# Patient Record
Sex: FEMALE | Race: WHITE | Employment: OTHER | URBAN - METROPOLITAN AREA
[De-identification: names, ages, dates, MRNs, and addresses within clinical notes are randomized per-mention and may not be internally consistent; named-entity substitution may affect disease eponyms.]

---

## 2018-05-02 PROBLEM — K92.1 HEMATOCHEZIA: Status: ACTIVE | Noted: 2018-05-02

## 2018-05-02 PROBLEM — Z86.010 HISTORY OF COLON POLYPS: Status: ACTIVE | Noted: 2018-05-02

## 2019-03-04 PROCEDURE — 88305 TISSUE EXAM BY PATHOLOGIST: CPT | Performed by: INTERNAL MEDICINE

## 2019-11-04 NOTE — H&P (VIEW-ONLY)
[facility] [place] 1940 ICD-10-CM ICD-9-CM 1. Galt-Walker grade 3 cystocele N81.10 618.01 AMB POC URINALYSIS DIP STICK AUTO W/O MICRO 2. Urge urinary incontinence N39.41 788.31 CULTURE, URINE Assessment and Plan: 
UA today: trace blood, trace leuks Cath PVR 10/7/19: 200cc 1. Grade 3 Cystocele / Grade 3 Rectocele Found on exam 10/7/19. Will proceed with Robotic Lap Sacrocolpopexy possible open on 11/26/19 as scheduled. All questions answered to pt and daughter satisfaction. Urine sent for culture pre operatively. Will prescribe abx as necessary. 2. Urgency / UUI Will address post-operatively RTO post-op BMI 25.39 Discussion: Cystocele Repair The risks, benefits and alternatives to surgical intervention were discussed at length with the patient. Alternatives include no intervention, use of pessary, more traditional repair of cystocele, enterocele or rectocele with autologous fascia as support, the use of mesh for support,colpocleisis or the minimally invasive robotic assisted laparoscopic sacrocolpopexy. The patient is most interested in robotic surgery, so this was reviewed in the greatest detail. The intended benefit of mesh placement is to improve or relieve the pelvic organ prolapse symptoms. Risks of the procedure include, but are not limited to, bleeding, wound infection, injury to surrounding structures during the procedure including bowel, mesh erosion to the bladder or extrusion to the vaginal wall, or recurrence of the prolapse due to mesh loosening. If an infection develops around the mesh material, this can be difficult to treat. If the bladder, rectum or urethra is injured during the procedure, this could result in the need for longer post-operative use of a catheter or termination of the procedure without placement of the mesh. The FDA warnings surrounding vaginal mesh were discussed.  The patient expresses an understanding of these risks, benefits and alternatives, had all questions answered and requests that we proceed as planned with robotic sacrocolpopexy in the MOR with intra-operative cystoscopy. Chief Complaint Patient presents with  
 Other  
  cystocele History of Present Illness: 
Yaneli Pichardo is a 78 y.o. female who presents today in follow up for an established history of grade 3 cystocele and grade 3 rectocele. Patient is scheduled for Robotic Lap Sacrocolpopexy possible open on 11/26/19 and presents today for pre-op evaluation. Today, patient is doing well. C/o sensation of bulge in vagina No pelvic pain or dyspareunia NO vaginal discharge Prior hysterectomy for fibroids in the distant past  
  
At baseline she has urinary incontinence Wears pads throughout the day Notes UUI, denies any CHELSEA Neurological issues: NO  
Pelvic or back issues: NO  
Prior kidney or bladder surgeries: NO  
Prior history of nephrolithiasis: NO 
 
 
 
AUA-IPSS: 
AUA Symptom Score 10/7/2019 Over the past month how often have you had the sensation that your bladder was not completely empty after you finished urinating? 3 Over the past month, how often have had to urinate again less than 2 hours after you last finished urinating? 2 Over the past month, how often have you found you stopped and started again several times when you urinated? 0 Over the past month, how often have you found it difficult to postpone urination? 3 Over the past month, how often have you had a weak urinary stream? 3 Over the past month, how often have you had to push or strain to begin urinating? 2 Over the past month, how many times did you most typically get up to urinate from the time you went to bed at night until the time you got up in the morning? 1  
AUA Score 14 If you were to spend the rest of your life with your urinary condition the way it is now, how would you feel about that? Mostly dissatisfied AUA Assessment Score: 
 ;   
AUA Bother Rating:   
 
 
Past Medical History:  
Diagnosis Date  Atrophic vaginitis  Cataract  Elevated blood pressure reading 1/14/2016  Female cystocele   
 asymptomatic  Glaucoma  Hypothyroidism 11/09/2015  Mixed hyperlipidemia 01/14/2016  Proteinuria 01/14/2016  Sarcoidosis 12/01/2014  Urinary incontinence Past Surgical History:  
Procedure Laterality Date  HX CATARACT REMOVAL  11/2011; 10/2011  
 both  HX OTHER SURGICAL  04/2010 R finger , 3rd finger Trigger release  HX OTHER SURGICAL  11/2009 Right hand excision Lipoma Dr. Raymundo Blinks Current Outpatient Medications:  
  SYNTHROID 50 mcg tablet, , Disp: , Rfl:  
 
Allergies Allergen Reactions  Latex Rash  Iodinated Contrast Media Hives  Penicillins Hives  Nitrofurantoin Unknown (comments) Social History Socioeconomic History  Marital status: LEGALLY  Spouse name: Not on file  Number of children: Not on file  Years of education: Not on file  Highest education level: Not on file Occupational History  Not on file Social Needs  Financial resource strain: Not on file  Food insecurity:  
  Worry: Not on file Inability: Not on file  Transportation needs:  
  Medical: Not on file Non-medical: Not on file Tobacco Use  Smoking status: Never Smoker  Smokeless tobacco: Never Used Substance and Sexual Activity  Alcohol use: Never Frequency: Never  Drug use: Not on file  Sexual activity: Not on file Lifestyle  Physical activity:  
  Days per week: Not on file Minutes per session: Not on file  Stress: Not on file Relationships  Social connections:  
  Talks on phone: Not on file Gets together: Not on file Attends Druze service: Not on file Active member of club or organization: Not on file Attends meetings of clubs or organizations: Not on file Relationship status: Not on file  Intimate partner violence:  
  Fear of current or ex partner: Not on file Emotionally abused: Not on file Physically abused: Not on file Forced sexual activity: Not on file Other Topics Concern  Not on file Social History Narrative  Not on file Family History Problem Relation Age of Onset  Coronary Artery Disease Mother  Cancer Father   
     malignant neoplastic disease(brain)  Coronary Artery Disease Sister  Other Brother   
     neurological finding Review of Systems Constitutional: Fever: No 
Skin: Rash: No 
HEENT: Hearing difficulty: No 
Eyes: Blurred vision: No 
Cardiovascular: Chest pain: No 
Respiratory: Shortness of breath:  
Gastrointestinal: Nausea/vomiting: No 
Musculoskeletal: Back pain: No 
Neurological: Weakness: No 
Psychological: Memory loss: No 
Comments/additional findings:  
All other systems reviewed and are negative PHYSICAL EXAMINATION:  
 
Visit Vitals /76 Ht 5' (1.524 m) Wt 130 lb (59 kg) BMI 25.39 kg/m² Constitutional: Well developed, well-nourished female in no acute distress. CV:  No peripheral swelling noted Respiratory: No respiratory distress or difficulties Abdomen:  Soft and nontender. No masses. No hepatosplenomegaly, No CVA tenderness, no Suprapubic tenderness.  Female:  10/7/2019 Urethral Hypermobility: NO Urethral lesions: NO 
Pelvic muscles tenderness: NO. 
Prior Hysterectomy: YES 
Pelvic organ prolapse: YES Cystocele:YES, grade 3 Rectocele:YES, grade 3 Enterocele:NO Apical :NO Skin:  Normal color. No evidence of jaundice. Neuro/Psych:  Patient with appropriate affect. Alert and oriented. Lymphatic:   No enlargement of supraclavicular lymph nodes. LE: No edema. Musculoskeletal: PS muscle tenderness: NO 
                              SI joint tenderness: NO 
         Pubic symphysis tenderness: NO         Adductor muscle tenderness: NO 
 
 Catheterized PVR Procedure: 200cc The genital and perineal areas were cleansed with antiseptic solution on the cotton balls. With clean gloves the area was cleansed with antiseptic solution again. I applied sterile lubricant liberally to the catheter tip, lubricating at least six inches of the catheter. A 14 fr cath catheter was inserted into the urinary meatus. When urine started to flow, I inserted the catheter approximately one inch further without difficulty and bladder drained. Catheter size:  14 Type: Strait tip Material: All silicone Urine was:   clear Specimen obtained:  YES Labs & Imaging:  
Results for orders placed or performed in visit on 11/04/19 AMB POC URINALYSIS DIP STICK AUTO W/O MICRO Result Value Ref Range Color (UA POC) Yellow Clarity (UA POC) Clear Glucose (UA POC) Negative Negative Bilirubin (UA POC) Negative Negative Ketones (UA POC) Trace Negative Specific gravity (UA POC) 1.015 1.001 - 1.035 Blood (UA POC) Trace Negative pH (UA POC) 6.0 4.6 - 8.0 Protein (UA POC) Negative Negative Urobilinogen (UA POC) 0.2 mg/dL 0.2 - 1 Nitrites (UA POC) Negative Negative Leukocyte esterase (UA POC) Trace Negative A copy of today's office visit with all pertinent imaging results and labs were sent to the referring MD Betzaida Nguyen MD  
 
 
Medical Documentation is provided with the assistance of Kavitha Ricketts, Medical Scribe for Betzaida Ojeda MD

## 2019-11-25 ENCOUNTER — ANESTHESIA EVENT (OUTPATIENT)
Dept: SURGERY | Age: 79
End: 2019-11-25
Payer: MEDICARE

## 2019-11-25 NOTE — PERIOP NOTES
PAT - SURGICAL PRE-ADMISSION INSTRUCTIONS    NAME:  Jose Hodges                                                          TODAY'S DATE:  11/25/2019    SURGERY DATE:  11/26/2019                                  SURGERY ARRIVAL TIME:   1000    1. Do NOT eat or drink anything, including candy or gum, after 0200 on 11/26 , unless you have specific instructions from your Surgeon or Anesthesia Provider to do so. 2. No smoking on the day of surgery. 3. No alcohol 24 hours prior to the day of surgery. 4. No recreational drugs for one week prior to the day of surgery. 5. Leave all valuables, including money/purse, at home. 6. Remove all jewelry, nail polish, makeup (including mascara); no lotions, powders, deodorant, or perfume/cologne/after shave. 7. Glasses/Contact lenses and Dentures may be worn to the hospital.  They will be removed prior to surgery. 8. Call your doctor if symptoms of a cold or illness develop within 24 ours prior to surgery. 9. AN ADULT MUST DRIVE YOU HOME AFTER OUTPATIENT SURGERY. 10. If you are having an OUTPATIENT procedure, please make arrangements for a responsible adult to be with you for 24 hours after your surgery. 11. If you are admitted to the hospital, you will be assigned to a bed after surgery is complete. Normally a family member will not be able to see you until you are in your assigned bed. 15. Family is encouraged to accompany you to the hospital.  We ask visitors in the treatment area to be limited to ONE person at a time to ensure patient privacy. EXCEPTIONS WILL BE MADE AS NEEDED. 15. Children under 12 are discouraged from entering the treatment area and need to be supervised by an adult when in the waiting room. Special Instructions: Take these medications the morning of surgery with a sip of water:  Thyroid    Patient Prep:    shower with anti-bacterial soap    These surgical instructions were reviewed with Jackeline Nelson during the PAT phone call. Directions:   On the morning of surgery, please go to the [facility]. Enter the building from the [facility] entrance, 1st floor (next to the Emergency Room entrance). Take the elevator to the 2nd floor. Sign in at the Registration Desk.     If you have any questions and/or concerns, please do not hesitate to call:  (Prior to the day of surgery)  [facility] unit:  [phone]  (Day of surgery)  [facility] unit:  [phone]

## 2019-11-26 ENCOUNTER — HOSPITAL ENCOUNTER (OUTPATIENT)
Age: 79
Setting detail: OBSERVATION
LOS: 1 days | Discharge: HOME | End: 2019-11-27
Attending: UROLOGY | Admitting: UROLOGY
Payer: MEDICARE

## 2019-11-26 ENCOUNTER — ANESTHESIA (OUTPATIENT)
Dept: SURGERY | Age: 79
End: 2019-11-26
Payer: MEDICARE

## 2019-11-26 DIAGNOSIS — N81.10 PELVIC ORGAN PROLAPSE QUANTIFICATION STAGE 3 CYSTOCELE: Primary | ICD-10-CM

## 2019-11-26 LAB
ABO + RH BLD: NORMAL
BLOOD GROUP ANTIBODIES SERPL: NORMAL
SPECIMEN EXP DATE BLD: NORMAL

## 2019-11-26 PROCEDURE — 74011250636 HC RX REV CODE- 250/636: Performed by: NURSE ANESTHETIST, CERTIFIED REGISTERED

## 2019-11-26 PROCEDURE — 99218 HC RM OBSERVATION: CPT

## 2019-11-26 PROCEDURE — 77030035277 HC OBTRTR BLDELSS DISP INTU -B: Performed by: UROLOGY

## 2019-11-26 PROCEDURE — 76210000006 HC OR PH I REC 0.5 TO 1 HR: Performed by: UROLOGY

## 2019-11-26 PROCEDURE — 74011000258 HC RX REV CODE- 258: Performed by: UROLOGY

## 2019-11-26 PROCEDURE — 77030012770 HC TRCR OPT FX AMR -B: Performed by: UROLOGY

## 2019-11-26 PROCEDURE — 77030022704 HC SUT VLOC COVD -B: Performed by: UROLOGY

## 2019-11-26 PROCEDURE — 77030037021 HC SLNG PELV MSH Y SHP BLU UPSYLON BSC -G: Performed by: UROLOGY

## 2019-11-26 PROCEDURE — 77030018842 HC SOL IRR SOD CL 9% BAXT -A: Performed by: UROLOGY

## 2019-11-26 PROCEDURE — 77030006984: Performed by: UROLOGY

## 2019-11-26 PROCEDURE — 77030032490 HC SLV COMPR SCD KNE COVD -B: Performed by: UROLOGY

## 2019-11-26 PROCEDURE — 86850 RBC ANTIBODY SCREEN: CPT

## 2019-11-26 PROCEDURE — 74011250637 HC RX REV CODE- 250/637: Performed by: NURSE ANESTHETIST, CERTIFIED REGISTERED

## 2019-11-26 PROCEDURE — 77030020263 HC SOL INJ SOD CL0.9% LFCR 1000ML: Performed by: UROLOGY

## 2019-11-26 PROCEDURE — 77030008771 HC TU NG SALEM SUMP -A: Performed by: ANESTHESIOLOGY

## 2019-11-26 PROCEDURE — 74011000250 HC RX REV CODE- 250: Performed by: UROLOGY

## 2019-11-26 PROCEDURE — 74011000272 HC RX REV CODE- 272: Performed by: UROLOGY

## 2019-11-26 PROCEDURE — 77030027138 HC INCENT SPIROMETER -A

## 2019-11-26 PROCEDURE — 77030040356 HC CORD BPLR FRCP COVD -A: Performed by: UROLOGY

## 2019-11-26 PROCEDURE — 77030010545: Performed by: UROLOGY

## 2019-11-26 PROCEDURE — 77030008683 HC TU ET CUF COVD -A: Performed by: ANESTHESIOLOGY

## 2019-11-26 PROCEDURE — 77030013079 HC BLNKT BAIR HGGR 3M -A: Performed by: ANESTHESIOLOGY

## 2019-11-26 PROCEDURE — 77030018813 HC SCIS LAPSCP EPIX DISP AMR -B: Performed by: UROLOGY

## 2019-11-26 PROCEDURE — 74011000250 HC RX REV CODE- 250: Performed by: NURSE ANESTHETIST, CERTIFIED REGISTERED

## 2019-11-26 PROCEDURE — 77030016151 HC PROTCTR LNS DFOG COVD -B: Performed by: UROLOGY

## 2019-11-26 PROCEDURE — 77030010935 HC CLP LIG ABSRB TELE -B: Performed by: UROLOGY

## 2019-11-26 PROCEDURE — 74011250636 HC RX REV CODE- 250/636: Performed by: STUDENT IN AN ORGANIZED HEALTH CARE EDUCATION/TRAINING PROGRAM

## 2019-11-26 PROCEDURE — 77030031139 HC SUT VCRL2 J&J -A: Performed by: UROLOGY

## 2019-11-26 PROCEDURE — 74011250636 HC RX REV CODE- 250/636: Performed by: UROLOGY

## 2019-11-26 PROCEDURE — 77030008608 HC TRCR ENDOSC SMTH AMR -B: Performed by: UROLOGY

## 2019-11-26 PROCEDURE — 77030034696 HC CATH URETH FOL 2W BARD -A: Performed by: UROLOGY

## 2019-11-26 PROCEDURE — 77030002924 HC SUT GORTX WLGO -B: Performed by: UROLOGY

## 2019-11-26 PROCEDURE — 74011250637 HC RX REV CODE- 250/637: Performed by: STUDENT IN AN ORGANIZED HEALTH CARE EDUCATION/TRAINING PROGRAM

## 2019-11-26 PROCEDURE — 76010000880 HC OR TIME 4 TO 4.5HR INTENSV - TIER 2: Performed by: UROLOGY

## 2019-11-26 PROCEDURE — 77030039266 HC ADH SKN EXOFIN S2SG -A: Performed by: UROLOGY

## 2019-11-26 PROCEDURE — 77030009848 HC PASSR SUT SET COOP -C: Performed by: UROLOGY

## 2019-11-26 PROCEDURE — 77030002933 HC SUT MCRYL J&J -A: Performed by: UROLOGY

## 2019-11-26 PROCEDURE — 74011250637 HC RX REV CODE- 250/637: Performed by: UROLOGY

## 2019-11-26 PROCEDURE — 77030002966 HC SUT PDS J&J -A: Performed by: UROLOGY

## 2019-11-26 PROCEDURE — 76060000039 HC ANESTHESIA 4 TO 4.5 HR: Performed by: UROLOGY

## 2019-11-26 PROCEDURE — 77030021678 HC GLIDESCP STAT DISP VERT -B: Performed by: ANESTHESIOLOGY

## 2019-11-26 PROCEDURE — 77030008574 HC TBNG SUC IRR STRY -B: Performed by: UROLOGY

## 2019-11-26 DEVICE — TRADITIONAL Y MESH
Type: IMPLANTABLE DEVICE | Site: ABDOMEN | Status: FUNCTIONAL
Brand: UPSYLON™

## 2019-11-26 RX ORDER — ZINC GLUCONATE 10 MG
LOZENGE ORAL
COMMUNITY

## 2019-11-26 RX ORDER — LIDOCAINE HYDROCHLORIDE 10 MG/ML
0.1 INJECTION, SOLUTION EPIDURAL; INFILTRATION; INTRACAUDAL; PERINEURAL AS NEEDED
Status: DISCONTINUED | OUTPATIENT
Start: 2019-11-26 | End: 2019-11-27 | Stop reason: HOSPADM

## 2019-11-26 RX ORDER — CLINDAMYCIN PHOSPHATE 900 MG/50ML
900 INJECTION INTRAVENOUS
Status: COMPLETED | OUTPATIENT
Start: 2019-11-26 | End: 2019-11-26

## 2019-11-26 RX ORDER — ONDANSETRON 2 MG/ML
4 INJECTION INTRAMUSCULAR; INTRAVENOUS
Status: DISCONTINUED | OUTPATIENT
Start: 2019-11-26 | End: 2019-11-27 | Stop reason: HOSPADM

## 2019-11-26 RX ORDER — BUPIVACAINE HYDROCHLORIDE 2.5 MG/ML
INJECTION, SOLUTION EPIDURAL; INFILTRATION; INTRACAUDAL AS NEEDED
Status: DISCONTINUED | OUTPATIENT
Start: 2019-11-26 | End: 2019-11-26 | Stop reason: HOSPADM

## 2019-11-26 RX ORDER — BISMUTH SUBSALICYLATE 262 MG
1 TABLET,CHEWABLE ORAL DAILY
COMMUNITY

## 2019-11-26 RX ORDER — SUCCINYLCHOLINE CHLORIDE 100 MG/5ML
SYRINGE (ML) INTRAVENOUS AS NEEDED
Status: DISCONTINUED | OUTPATIENT
Start: 2019-11-26 | End: 2019-11-26 | Stop reason: HOSPADM

## 2019-11-26 RX ORDER — FENTANYL CITRATE 50 UG/ML
25 INJECTION, SOLUTION INTRAMUSCULAR; INTRAVENOUS AS NEEDED
Status: DISCONTINUED | OUTPATIENT
Start: 2019-11-26 | End: 2019-11-26 | Stop reason: HOSPADM

## 2019-11-26 RX ORDER — NALOXONE HYDROCHLORIDE 0.4 MG/ML
0.4 INJECTION, SOLUTION INTRAMUSCULAR; INTRAVENOUS; SUBCUTANEOUS AS NEEDED
Status: DISCONTINUED | OUTPATIENT
Start: 2019-11-26 | End: 2019-11-27 | Stop reason: HOSPADM

## 2019-11-26 RX ORDER — GLUCOSAMINE/CHONDR SU A SOD 750-600 MG
TABLET ORAL
COMMUNITY

## 2019-11-26 RX ORDER — CLINDAMYCIN PHOSPHATE 600 MG/50ML
600 INJECTION INTRAVENOUS EVERY 8 HOURS
Status: DISCONTINUED | OUTPATIENT
Start: 2019-11-26 | End: 2019-11-26

## 2019-11-26 RX ORDER — CHOLECALCIFEROL (VITAMIN D3) 125 MCG
CAPSULE ORAL
COMMUNITY

## 2019-11-26 RX ORDER — LIDOCAINE HYDROCHLORIDE 20 MG/ML
INJECTION, SOLUTION EPIDURAL; INFILTRATION; INTRACAUDAL; PERINEURAL AS NEEDED
Status: DISCONTINUED | OUTPATIENT
Start: 2019-11-26 | End: 2019-11-26 | Stop reason: HOSPADM

## 2019-11-26 RX ORDER — SODIUM CHLORIDE 0.9 % (FLUSH) 0.9 %
5-40 SYRINGE (ML) INJECTION EVERY 8 HOURS
Status: DISCONTINUED | OUTPATIENT
Start: 2019-11-26 | End: 2019-11-27 | Stop reason: HOSPADM

## 2019-11-26 RX ORDER — SODIUM CHLORIDE, SODIUM LACTATE, POTASSIUM CHLORIDE, CALCIUM CHLORIDE 600; 310; 30; 20 MG/100ML; MG/100ML; MG/100ML; MG/100ML
50 INJECTION, SOLUTION INTRAVENOUS CONTINUOUS
Status: DISCONTINUED | OUTPATIENT
Start: 2019-11-26 | End: 2019-11-26 | Stop reason: HOSPADM

## 2019-11-26 RX ORDER — SODIUM CHLORIDE, SODIUM LACTATE, POTASSIUM CHLORIDE, CALCIUM CHLORIDE 600; 310; 30; 20 MG/100ML; MG/100ML; MG/100ML; MG/100ML
25 INJECTION, SOLUTION INTRAVENOUS CONTINUOUS
Status: DISPENSED | OUTPATIENT
Start: 2019-11-26 | End: 2019-11-27

## 2019-11-26 RX ORDER — HYDROCODONE BITARTRATE AND ACETAMINOPHEN 5; 325 MG/1; MG/1
1 TABLET ORAL
Qty: 12 TAB | Refills: 0 | Status: SHIPPED | OUTPATIENT
Start: 2019-11-26 | End: 2019-11-29

## 2019-11-26 RX ORDER — SODIUM CHLORIDE, SODIUM LACTATE, POTASSIUM CHLORIDE, CALCIUM CHLORIDE 600; 310; 30; 20 MG/100ML; MG/100ML; MG/100ML; MG/100ML
INJECTION, SOLUTION INTRAVENOUS
Status: DISCONTINUED | OUTPATIENT
Start: 2019-11-26 | End: 2019-11-26 | Stop reason: HOSPADM

## 2019-11-26 RX ORDER — ONDANSETRON 2 MG/ML
4 INJECTION INTRAMUSCULAR; INTRAVENOUS ONCE
Status: COMPLETED | OUTPATIENT
Start: 2019-11-26 | End: 2019-11-26

## 2019-11-26 RX ORDER — DIPHENHYDRAMINE HCL 25 MG
25 CAPSULE ORAL
Status: DISCONTINUED | OUTPATIENT
Start: 2019-11-26 | End: 2019-11-27 | Stop reason: HOSPADM

## 2019-11-26 RX ORDER — HEPARIN SODIUM 5000 [USP'U]/ML
5000 INJECTION, SOLUTION INTRAVENOUS; SUBCUTANEOUS EVERY 8 HOURS
Status: DISCONTINUED | OUTPATIENT
Start: 2019-11-26 | End: 2019-11-27 | Stop reason: HOSPADM

## 2019-11-26 RX ORDER — MORPHINE SULFATE 2 MG/ML
2 INJECTION, SOLUTION INTRAMUSCULAR; INTRAVENOUS
Status: DISCONTINUED | OUTPATIENT
Start: 2019-11-26 | End: 2019-11-27 | Stop reason: HOSPADM

## 2019-11-26 RX ORDER — SODIUM CHLORIDE, SODIUM LACTATE, POTASSIUM CHLORIDE, CALCIUM CHLORIDE 600; 310; 30; 20 MG/100ML; MG/100ML; MG/100ML; MG/100ML
100 INJECTION, SOLUTION INTRAVENOUS CONTINUOUS
Status: DISCONTINUED | OUTPATIENT
Start: 2019-11-26 | End: 2019-11-27 | Stop reason: HOSPADM

## 2019-11-26 RX ORDER — GABAPENTIN 100 MG/1
200 CAPSULE ORAL 3 TIMES DAILY
Status: DISCONTINUED | OUTPATIENT
Start: 2019-11-26 | End: 2019-11-27 | Stop reason: HOSPADM

## 2019-11-26 RX ORDER — VECURONIUM BROMIDE FOR INJECTION 1 MG/ML
INJECTION, POWDER, LYOPHILIZED, FOR SOLUTION INTRAVENOUS AS NEEDED
Status: DISCONTINUED | OUTPATIENT
Start: 2019-11-26 | End: 2019-11-26 | Stop reason: HOSPADM

## 2019-11-26 RX ORDER — FAMOTIDINE 20 MG/1
20 TABLET, FILM COATED ORAL ONCE
Status: COMPLETED | OUTPATIENT
Start: 2019-11-26 | End: 2019-11-26

## 2019-11-26 RX ORDER — LEVOTHYROXINE SODIUM 25 UG/1
50 TABLET ORAL
Status: DISCONTINUED | OUTPATIENT
Start: 2019-11-27 | End: 2019-11-27 | Stop reason: HOSPADM

## 2019-11-26 RX ORDER — PROPOFOL 10 MG/ML
INJECTION, EMULSION INTRAVENOUS AS NEEDED
Status: DISCONTINUED | OUTPATIENT
Start: 2019-11-26 | End: 2019-11-26 | Stop reason: HOSPADM

## 2019-11-26 RX ORDER — DOCUSATE SODIUM 100 MG/1
100 CAPSULE, LIQUID FILLED ORAL 2 TIMES DAILY
Qty: 40 CAP | Refills: 0 | Status: SHIPPED | OUTPATIENT
Start: 2019-11-26 | End: 2019-12-16

## 2019-11-26 RX ORDER — DOCUSATE SODIUM 100 MG/1
100 CAPSULE, LIQUID FILLED ORAL 2 TIMES DAILY
Status: DISCONTINUED | OUTPATIENT
Start: 2019-11-26 | End: 2019-11-26

## 2019-11-26 RX ORDER — FENTANYL CITRATE 50 UG/ML
INJECTION, SOLUTION INTRAMUSCULAR; INTRAVENOUS AS NEEDED
Status: DISCONTINUED | OUTPATIENT
Start: 2019-11-26 | End: 2019-11-26 | Stop reason: HOSPADM

## 2019-11-26 RX ORDER — SENNOSIDES 8.6 MG/1
2 TABLET ORAL
Status: DISCONTINUED | OUTPATIENT
Start: 2019-11-26 | End: 2019-11-27 | Stop reason: HOSPADM

## 2019-11-26 RX ORDER — SODIUM CHLORIDE 0.9 % (FLUSH) 0.9 %
5-40 SYRINGE (ML) INJECTION AS NEEDED
Status: DISCONTINUED | OUTPATIENT
Start: 2019-11-26 | End: 2019-11-27 | Stop reason: HOSPADM

## 2019-11-26 RX ORDER — DOCUSATE SODIUM 100 MG/1
100 CAPSULE, LIQUID FILLED ORAL 2 TIMES DAILY
Status: DISCONTINUED | OUTPATIENT
Start: 2019-11-26 | End: 2019-11-27 | Stop reason: HOSPADM

## 2019-11-26 RX ORDER — ONDANSETRON 2 MG/ML
INJECTION INTRAMUSCULAR; INTRAVENOUS AS NEEDED
Status: DISCONTINUED | OUTPATIENT
Start: 2019-11-26 | End: 2019-11-26 | Stop reason: HOSPADM

## 2019-11-26 RX ORDER — PYRIDOXINE HCL (VITAMIN B6) 100 MG
100 TABLET ORAL DAILY
COMMUNITY

## 2019-11-26 RX ORDER — HYDROMORPHONE HYDROCHLORIDE 1 MG/ML
0.2 INJECTION, SOLUTION INTRAMUSCULAR; INTRAVENOUS; SUBCUTANEOUS
Status: DISCONTINUED | OUTPATIENT
Start: 2019-11-26 | End: 2019-11-26 | Stop reason: HOSPADM

## 2019-11-26 RX ORDER — OXYCODONE HYDROCHLORIDE 5 MG/1
5 TABLET ORAL
Status: DISCONTINUED | OUTPATIENT
Start: 2019-11-26 | End: 2019-11-27 | Stop reason: HOSPADM

## 2019-11-26 RX ORDER — SODIUM CHLORIDE 9 MG/ML
INJECTION, SOLUTION INTRAVENOUS
Status: DISCONTINUED | OUTPATIENT
Start: 2019-11-26 | End: 2019-11-26 | Stop reason: HOSPADM

## 2019-11-26 RX ORDER — DEXAMETHASONE SODIUM PHOSPHATE 4 MG/ML
INJECTION, SOLUTION INTRA-ARTICULAR; INTRALESIONAL; INTRAMUSCULAR; INTRAVENOUS; SOFT TISSUE AS NEEDED
Status: DISCONTINUED | OUTPATIENT
Start: 2019-11-26 | End: 2019-11-26 | Stop reason: HOSPADM

## 2019-11-26 RX ORDER — ACETAMINOPHEN 325 MG/1
650 TABLET ORAL EVERY 4 HOURS
Status: DISCONTINUED | OUTPATIENT
Start: 2019-11-26 | End: 2019-11-27 | Stop reason: HOSPADM

## 2019-11-26 RX ORDER — HEPARIN SODIUM 5000 [USP'U]/ML
5000 INJECTION, SOLUTION INTRAVENOUS; SUBCUTANEOUS ONCE
Status: COMPLETED | OUTPATIENT
Start: 2019-11-26 | End: 2019-11-26

## 2019-11-26 RX ORDER — CYANOCOBALAMIN 1000 UG/ML
1000 INJECTION, SOLUTION INTRAMUSCULAR; SUBCUTANEOUS ONCE
COMMUNITY

## 2019-11-26 RX ADMIN — DOCUSATE SODIUM 100 MG: 100 CAPSULE, LIQUID FILLED ORAL at 21:45

## 2019-11-26 RX ADMIN — VECURONIUM BROMIDE FOR INJECTION 1 MG: 1 INJECTION, POWDER, LYOPHILIZED, FOR SOLUTION INTRAVENOUS at 16:15

## 2019-11-26 RX ADMIN — VECURONIUM BROMIDE FOR INJECTION 1 MG: 1 INJECTION, POWDER, LYOPHILIZED, FOR SOLUTION INTRAVENOUS at 15:05

## 2019-11-26 RX ADMIN — SODIUM CHLORIDE, SODIUM LACTATE, POTASSIUM CHLORIDE, AND CALCIUM CHLORIDE: 600; 310; 30; 20 INJECTION, SOLUTION INTRAVENOUS at 15:15

## 2019-11-26 RX ADMIN — HEPARIN SODIUM 5000 UNITS: 5000 INJECTION INTRAVENOUS; SUBCUTANEOUS at 21:45

## 2019-11-26 RX ADMIN — VECURONIUM BROMIDE FOR INJECTION 3 MG: 1 INJECTION, POWDER, LYOPHILIZED, FOR SOLUTION INTRAVENOUS at 15:11

## 2019-11-26 RX ADMIN — OXYCODONE HYDROCHLORIDE 5 MG: 5 TABLET ORAL at 23:03

## 2019-11-26 RX ADMIN — LIDOCAINE HYDROCHLORIDE 60 MG: 20 INJECTION, SOLUTION INTRAVENOUS at 15:05

## 2019-11-26 RX ADMIN — Medication 80 MG: at 15:05

## 2019-11-26 RX ADMIN — SODIUM CHLORIDE, SODIUM LACTATE, POTASSIUM CHLORIDE, AND CALCIUM CHLORIDE 100 ML/HR: 600; 310; 30; 20 INJECTION, SOLUTION INTRAVENOUS at 20:36

## 2019-11-26 RX ADMIN — VECURONIUM BROMIDE FOR INJECTION 3 MG: 1 INJECTION, POWDER, LYOPHILIZED, FOR SOLUTION INTRAVENOUS at 17:58

## 2019-11-26 RX ADMIN — FAMOTIDINE 20 MG: 20 TABLET ORAL at 10:45

## 2019-11-26 RX ADMIN — CLINDAMYCIN PHOSPHATE 600 MG: 150 INJECTION, SOLUTION INTRAVENOUS at 23:01

## 2019-11-26 RX ADMIN — DEXAMETHASONE SODIUM PHOSPHATE 4 MG: 4 INJECTION, SOLUTION INTRA-ARTICULAR; INTRALESIONAL; INTRAMUSCULAR; INTRAVENOUS; SOFT TISSUE at 15:33

## 2019-11-26 RX ADMIN — GABAPENTIN 200 MG: 100 CAPSULE ORAL at 21:45

## 2019-11-26 RX ADMIN — SODIUM CHLORIDE: 900 INJECTION, SOLUTION INTRAVENOUS at 18:13

## 2019-11-26 RX ADMIN — VECURONIUM BROMIDE FOR INJECTION 0.5 MG: 1 INJECTION, POWDER, LYOPHILIZED, FOR SOLUTION INTRAVENOUS at 17:24

## 2019-11-26 RX ADMIN — HEPARIN SODIUM 5000 UNITS: 5000 INJECTION INTRAVENOUS; SUBCUTANEOUS at 10:44

## 2019-11-26 RX ADMIN — CLINDAMYCIN PHOSPHATE 900 MG: 900 INJECTION INTRAVENOUS at 15:13

## 2019-11-26 RX ADMIN — SODIUM CHLORIDE, SODIUM LACTATE, POTASSIUM CHLORIDE, AND CALCIUM CHLORIDE 100 ML/HR: 600; 310; 30; 20 INJECTION, SOLUTION INTRAVENOUS at 23:00

## 2019-11-26 RX ADMIN — SODIUM CHLORIDE, SODIUM LACTATE, POTASSIUM CHLORIDE, AND CALCIUM CHLORIDE 25 ML/HR: 600; 310; 30; 20 INJECTION, SOLUTION INTRAVENOUS at 10:44

## 2019-11-26 RX ADMIN — HYDROMORPHONE HYDROCHLORIDE 0.2 MG: 1 INJECTION, SOLUTION INTRAMUSCULAR; INTRAVENOUS; SUBCUTANEOUS at 19:24

## 2019-11-26 RX ADMIN — ONDANSETRON 4 MG: 2 INJECTION INTRAMUSCULAR; INTRAVENOUS at 19:42

## 2019-11-26 RX ADMIN — FENTANYL CITRATE 50 MCG: 50 INJECTION, SOLUTION INTRAMUSCULAR; INTRAVENOUS at 16:31

## 2019-11-26 RX ADMIN — PROPOFOL 130 MG: 10 INJECTION, EMULSION INTRAVENOUS at 15:05

## 2019-11-26 RX ADMIN — ACETAMINOPHEN 650 MG: 325 TABLET, FILM COATED ORAL at 21:45

## 2019-11-26 RX ADMIN — SENNOSIDES 17.2 MG: 8.6 TABLET, FILM COATED ORAL at 21:45

## 2019-11-26 RX ADMIN — FENTANYL CITRATE 50 MCG: 50 INJECTION, SOLUTION INTRAMUSCULAR; INTRAVENOUS at 15:05

## 2019-11-26 RX ADMIN — ONDANSETRON 4 MG: 2 SOLUTION INTRAMUSCULAR; INTRAVENOUS at 18:35

## 2019-11-26 NOTE — ANESTHESIA PREPROCEDURE EVALUATION
Relevant Problems   No relevant active problems       Anesthetic History   No history of anesthetic complications            Review of Systems / Medical History  Patient summary reviewed and pertinent labs reviewed    Pulmonary  Within defined limits                 Neuro/Psych   Within defined limits           Cardiovascular  Within defined limits                  Comments: S/P Subdural hematoma   GI/Hepatic/Renal  Within defined limits              Endo/Other  Within defined limits    Hypothyroidism: well controlled       Other Findings              Physical Exam    Airway  Mallampati: II  TM Distance: < 4 cm  Neck ROM: normal range of motion   Mouth opening: Normal     Cardiovascular               Dental  No notable dental hx       Pulmonary                 Abdominal  GI exam deferred       Other Findings            Anesthetic Plan    ASA: 3  Anesthesia type: general          Induction: Intravenous  Anesthetic plan and risks discussed with: Patient

## 2019-11-26 NOTE — INTERVAL H&P NOTE
H&P Update:  Jose Hodges was seen and examined. History and physical has been reviewed. The patient has been examined.  There have been no significant clinical changes since the completion of the originally dated History and Physical.

## 2019-11-26 NOTE — OP NOTES
LOCATION: [facility]     OPERATIVE REPORT     Name: Erling Koyanagi  MRN#: [id number]  : 1940    Date of Surgery: 2019        PREOPERATIVE DIAGNOSES  1. Pelvic organ prolapse post-hysterectomy (cystocele grade 3, rectocele grade 3). 2. Failed pessary   3. Postmenopausal, Post Hysterectomy - Atrophic Vaginitis    POSTOPERATIVE DIAGNOSES  1. Same as above     PROCEDURES PERFORMED  1. Robotic Laparoscopic sacralcolpopexy. 2. Lysis of adhesion  3. Vaginal Examination under anesthesia    SURGEON: Keena Ramsay MD    SURGICAL ASSISTANT: Timo Sotelo PGY 5  SURGICAL ASSISTANT: Aleyda Larios      ESTIMATED BLOOD LOSS: 10 mL.     SPECIMENS REMOVED: none     ANESTHESIA: General.     ANTIBIOTICS: Clindamycin     COMPLICATIONS: None.     DRAINS: A 16-Lao Alvarez catheter.     DISPOSITION: To PACU and to the floor. IMPLANTS: Y-MESH (Upsylon )         INDICATIONS: Erling Koyanagi is a 78 y. o.female post-hysterectomy,   who has pelvic organ prolapse. The patient presented to me with symptomatic vaginal bulge. After  an extensive discussion, the decision was made to perform a robotic sacral  colpopexy for pelvic organ prolapse. Risks, benefits, alternatives were discussed including injury to the surrounding structures, vagina, bladder, urethra, rectum, bleeding, infection, post operative pain, bowel injury, possible need for a second operation, [employer] Stress incontinence. We also talked about he USPTF and SUFU / AUGS statements about the vaginal mesh. The vaginal mesh issues are unrelated to the mesh used for sacrocolpopexy. She understands and wishes to proceed and consent was obtained.     DESCRIPTION OF PROCEDURE: The  patient was brought into the operating room and placed in the supine position. Anesthesia was administered. She received a dose of  intravenous antibiotics.  She was placed into the dorsal lithotomy  position, and prepped and draped in the normal sterile fashion after all  pressure points were properly padded and she was secured to the  table. We performed a timeout. We then obtained insufflation into the abdomen  using a Veress needle. The robotic trocars were placed in the standard  Fashion, patient was placed in trendelenburg and DaVinci robot was docked. We encountered adhesion of bowel and omentum to the anterior abdominal wall and the pelvic side walls. These scar tissues were taking down sharply.      With the small bowel and and the sigmoid colon free up nicely, the sigmoid colon was retracted with robotic Cadiere grasper. Again, we  had good visualization of the pelvis. The vaginal stent was placed by the assistant. Attention was turned to the sacral promontory. We dissected down to the anterior  longitudinal ligament by using electrocautery and bipolar scissors. Good hemostasis was obtained. I then carried an incision along the  lateral side wall, staying medial to the ureter, but lateral to the sigmoid  and rectum. Care was taken not to injure the ureter. With the vaginal  stent in place, we carried down the vaginal dissection. We mobilized  the bladder off the vagina and carried down the dissection bluntly and  sharply with electrocautery also. There was no vaginal injury. We took down the bladder dissection roughly 5 inches, which was probably  close to the trigone. Similar dissection was carried out posteriorly to reflect  the rectum off the vagina. We followed the vagina until we got to the perirectal fat. There was no  injury noted to the rectum. We then connected the right lateral pelvic  wall incision to the vagina. Using Y graft [facility] [employer] Upsylon Mesh)  the Y-arms were trimmed to 6 cm. I then began securing the Y-  mesh onto the vaginal wall. We started with the apex using 2-0 [facility] in an interrupted fashion. We had two rows of four total anteriorly  and two rows of four posteriorly.      The tail of the mesh was then secured down to the Anterior longitudinal ligament overlying the sacral promontory  using 2-0 [place] times. Excess tension was avoided and appropriate suspension was confirmed. Excess mesh was trimmed. Hemostasis was  obtained. The peritoneum was closed using a V-Loc suture in a  running fashion. Good hemostasis obtained. The robotic trocars and instruments were then  removed after the abdomen was deflated. A 16-Khmer Alvarez catheter was then left in place.  The patient was  awakened from anesthesia and transferred to the PACU in stable  condition.           Kera Marte MD  11/26/2019

## 2019-11-27 VITALS
SYSTOLIC BLOOD PRESSURE: 115 MMHG | HEART RATE: 63 BPM | DIASTOLIC BLOOD PRESSURE: 68 MMHG | HEIGHT: 60 IN | WEIGHT: 132 LBS | OXYGEN SATURATION: 99 % | TEMPERATURE: 97.8 F | RESPIRATION RATE: 16 BRPM | BODY MASS INDEX: 25.91 KG/M2

## 2019-11-27 LAB
ANION GAP SERPL CALC-SCNC: 10 MMOL/L (ref 3–18)
BUN SERPL-MCNC: 18 MG/DL (ref 7–18)
BUN/CREAT SERPL: 31 (ref 12–20)
CALCIUM SERPL-MCNC: 8.4 MG/DL (ref 8.5–10.1)
CHLORIDE SERPL-SCNC: 107 MMOL/L (ref 100–111)
CO2 SERPL-SCNC: 21 MMOL/L (ref 21–32)
CREAT SERPL-MCNC: 0.59 MG/DL (ref 0.6–1.3)
GLUCOSE SERPL-MCNC: 87 MG/DL (ref 74–99)
HCT VFR BLD AUTO: 39.7 % (ref 35–45)
HGB BLD-MCNC: 12.8 G/DL (ref 12–16)
POTASSIUM SERPL-SCNC: 4.3 MMOL/L (ref 3.5–5.5)
SODIUM SERPL-SCNC: 138 MMOL/L (ref 136–145)

## 2019-11-27 PROCEDURE — 80048 BASIC METABOLIC PNL TOTAL CA: CPT

## 2019-11-27 PROCEDURE — 74011000258 HC RX REV CODE- 258: Performed by: UROLOGY

## 2019-11-27 PROCEDURE — 74011000250 HC RX REV CODE- 250: Performed by: UROLOGY

## 2019-11-27 PROCEDURE — 74011250637 HC RX REV CODE- 250/637: Performed by: STUDENT IN AN ORGANIZED HEALTH CARE EDUCATION/TRAINING PROGRAM

## 2019-11-27 PROCEDURE — 74011250636 HC RX REV CODE- 250/636: Performed by: STUDENT IN AN ORGANIZED HEALTH CARE EDUCATION/TRAINING PROGRAM

## 2019-11-27 PROCEDURE — 51798 US URINE CAPACITY MEASURE: CPT

## 2019-11-27 PROCEDURE — 36415 COLL VENOUS BLD VENIPUNCTURE: CPT

## 2019-11-27 PROCEDURE — 99218 HC RM OBSERVATION: CPT

## 2019-11-27 PROCEDURE — 85018 HEMOGLOBIN: CPT

## 2019-11-27 RX ADMIN — DOCUSATE SODIUM 100 MG: 100 CAPSULE, LIQUID FILLED ORAL at 10:35

## 2019-11-27 RX ADMIN — GABAPENTIN 200 MG: 100 CAPSULE ORAL at 15:00

## 2019-11-27 RX ADMIN — GABAPENTIN 200 MG: 100 CAPSULE ORAL at 10:34

## 2019-11-27 RX ADMIN — ACETAMINOPHEN 650 MG: 325 TABLET, FILM COATED ORAL at 16:00

## 2019-11-27 RX ADMIN — CLINDAMYCIN PHOSPHATE 600 MG: 150 INJECTION, SOLUTION INTRAVENOUS at 06:30

## 2019-11-27 RX ADMIN — ACETAMINOPHEN 650 MG: 325 TABLET, FILM COATED ORAL at 01:17

## 2019-11-27 RX ADMIN — LEVOTHYROXINE SODIUM 50 MCG: 25 TABLET ORAL at 06:29

## 2019-11-27 RX ADMIN — Medication 10 ML: at 16:01

## 2019-11-27 RX ADMIN — SODIUM CHLORIDE, SODIUM LACTATE, POTASSIUM CHLORIDE, AND CALCIUM CHLORIDE 100 ML/HR: 600; 310; 30; 20 INJECTION, SOLUTION INTRAVENOUS at 10:34

## 2019-11-27 RX ADMIN — OXYCODONE HYDROCHLORIDE 5 MG: 5 TABLET ORAL at 04:32

## 2019-11-27 RX ADMIN — CLINDAMYCIN PHOSPHATE 600 MG: 150 INJECTION, SOLUTION INTRAVENOUS at 16:00

## 2019-11-27 RX ADMIN — HEPARIN SODIUM 5000 UNITS: 5000 INJECTION INTRAVENOUS; SUBCUTANEOUS at 04:33

## 2019-11-27 RX ADMIN — ACETAMINOPHEN 650 MG: 325 TABLET, FILM COATED ORAL at 04:32

## 2019-11-27 RX ADMIN — ACETAMINOPHEN 650 MG: 325 TABLET, FILM COATED ORAL at 10:34

## 2019-11-27 NOTE — DISCHARGE SUMMARY
Discharge Summary     Patient ID:  Shorty Adams  [id number]   69 y.o.  1940    Admit date: 11/26/2019    Discharge Date: 11/27/2019      Admitting Physician: Aaron Bueno MD     Discharge Physician: Genet Russell MD    Admission Diagnoses: Pelvic organ prolapse quantification stage 3 cystocele [N81.10]    Last Procedure: Procedure(s):  Davinci Lap SACROCOLPOPEXY possible open ROBOTIC ASSISTED    Discharge Diagnoses: Active Problems:    Pelvic organ prolapse quantification stage 3 cystocele (11/26/2019)         Consults: None    RELAVENT LABS ( within last 72 hrs):    Recent Results (from the past 72 hour(s))   TYPE & SCREEN    Collection Time: 11/26/19 10:41 AM   Result Value Ref Range    Crossmatch Expiration 11/29/2019     ABO/Rh(D) A POSITIVE     Antibody screen NEG    METABOLIC PANEL, BASIC    Collection Time: 11/27/19  5:20 AM   Result Value Ref Range    Sodium 138 136 - 145 mmol/L    Potassium 4.3 3.5 - 5.5 mmol/L    Chloride 107 100 - 111 mmol/L    CO2 21 21 - 32 mmol/L    Anion gap 10 3.0 - 18 mmol/L    Glucose 87 74 - 99 mg/dL    BUN 18 7.0 - 18 MG/DL    Creatinine 0.59 (L) 0.6 - 1.3 MG/DL    BUN/Creatinine ratio 31 (H) 12 - 20      GFR est AA >60 >60 ml/min/1.73m2    GFR est non-AA >60 >60 ml/min/1.73m2    Calcium 8.4 (L) 8.5 - 10.1 MG/DL   HGB & HCT    Collection Time: 11/27/19  5:20 AM   Result Value Ref Range    HGB 12.8 12.0 - 16.0 g/dL    HCT 39.7 35.0 - 45.0 %       Significant Diagnostic Studies: labs: WNL     Hospital Course:   Normal hospital course for this procedure. Condition on Discharge: Satisfactory    Disposition: Home    Patient Instructions:   Current Discharge Medication List      START taking these medications    Details   HYDROcodone-acetaminophen (NORCO) 5-325 mg per tablet Take 1 Tab by mouth every six (6) hours as needed for Pain for up to 3 days. Max Daily Amount: 4 Tabs.   Qty: 12 Tab, Refills: 0    Associated Diagnoses: Pelvic organ prolapse quantification stage 3 cystocele      docusate sodium (COLACE) 100 mg capsule Take 1 Cap by mouth two (2) times a day for 20 days. Qty: 40 Cap, Refills: 0         CONTINUE these medications which have NOT CHANGED    Details   multivitamin (ONE A DAY) tablet Take 1 Tab by mouth daily. cyanocobalamin (VITAMIN B12) 1,000 mcg/mL injection 1,000 mcg by IntraMUSCular route once. pyridoxine, vitamin B6, (VITAMIN B-6) 100 mg tablet Take 100 mg by mouth daily. magnesium 250 mg tab Take  by mouth. !! OTHER gastrocom      !! OTHER absorbaide      !! OTHER thyrosol      Biotin 2,500 mcg cap Take  by mouth. !! OTHER Eye support      !! OTHER prevagen      cholecalciferol, vitamin D3, (VITAMIN D3) 2,000 unit tab Take  by mouth. SYNTHROID 50 mcg tablet Take 50 mcg by mouth Daily (before breakfast). !! - Potential duplicate medications found. Please discuss with provider. Diet: Reference my discharge instructions. Activity: Reference my discharge instructions. Follow-up Appointments   Procedures    FOLLOW UP VISIT Appointment in: Two Weeks F/u in office as previously scheduled. Call office to confirm/schedule appointment     F/u in office as previously scheduled. Call office to confirm/schedule appointment     Standing Status:   Standing     Number of Occurrences:   1     Order Specific Question:   Appointment in     Answer:    Two Weeks            Signed:  Dolores Mohr MD  November 27, 2019  1:47 PM     Stephen Samuel MD   Urologic Oncologist  [facility]  [facility]  Office [phone] Ext [phone]

## 2019-11-27 NOTE — PROGRESS NOTES
Reason for Admission:   sarcrocopopexy                   RRAT Score:   8                Plan for utilizing home health: not at this time                         Current Advanced Directive/Advance Care Plan: not on file                         Transition of Care Plan:      Tesha Reich pt and verified demographics. States she [place] moved here from [place] and is living with her daughter. She reports that at baseline she is independent with ADL's and uses no DME. The pt does drive and is able to obtain needed meds. She states that her daughter will be driving her home at discharge and denies concerns with plan to discharge home when medically ready. Patient has designated ___daughter_____________________ to participate in his/her discharge plan and to receive any needed information. Name: Hakeem Bedoya  Address:  Phone number:[phone]    Care Management Interventions  PCP Verified by CM: Yes  Last Visit to PCP: 11/25/19  Mode of Transport at Discharge: Self(daughter will drive)  Transition of Care Consult (CM Consult): Discharge Planning  Current Support Network: Relative's Home(lives in daughter's home. just moved here)  Confirm Follow Up Transport: Self  Plan discussed with Pt/Family/Caregiver: Yes  Discharge Location  Discharge Placement: Home      Patient and/or next of kin has been given and has signed the [facility] Outpatient Observation  Notification letter and all questions answered. Copy of this notice given to patient and copy placed on chart. Patient and/or next of kin has been given the Outpatient Observation Information and Notification letter and all questions answered.

## 2019-11-27 NOTE — DISCHARGE INSTRUCTIONS
DISCHARGE SUMMARY from Nurse    PATIENT INSTRUCTIONS:    After general anesthesia or intravenous sedation, for 24 hours or while taking prescription Narcotics:  · Limit your activities  · Do not drive and operate hazardous machinery  · Do not make important personal or business decisions  · Do  not drink alcoholic beverages  · If you have not urinated within 8 hours after discharge, please contact your surgeon on call. Report the following to your surgeon:  · Excessive pain, swelling, redness or odor of or around the surgical area  · Temperature over 100.5  · Nausea and vomiting lasting longer than 4 hours or if unable to take medications  · Any signs of decreased circulation or nerve impairment to extremity: change in color, persistent  numbness, tingling, coldness or increase pain  · Any questions      *  Please give a list of your current medications to your Primary Care Provider. *  Please update this list whenever your medications are discontinued, doses are      changed, or new medications (including over-the-counter products) are added. *  Please carry medication information at all times in case of emergency situations. These are general instructions for a healthy lifestyle:    No smoking/ No tobacco products/ Avoid exposure to second hand smoke  Surgeon General's Warning:  Quitting smoking now greatly reduces serious risk to your health. Obesity, smoking, and sedentary lifestyle greatly increases your risk for illness    A healthy diet, regular physical exercise & weight monitoring are important for maintaining a healthy lifestyle    You may be retaining fluid if you have a history of heart failure or if you experience any of the following symptoms:  Weight gain of 3 pounds or more overnight or 5 pounds in a week, increased swelling in our hands or feet or shortness of breath while lying flat in bed.   Please call your doctor as soon as you notice any of these symptoms; do not wait until your next office visit. The discharge information has been reviewed with the {PATIENT PARENT GUARDIAN:55747}. The {PATIENT PARENT GUARDIAN:50837} verbalized understanding. Discharge medications reviewed with the {Dishcarge meds reviewed CCKL:95396} and appropriate educational materials and side effects teaching were provided.   ___________________________________________________________________________________________________________________________________

## 2019-11-27 NOTE — PROGRESS NOTES
Urology Progress Note        Assessment/Plan:     Active Problems:    Pelvic organ prolapse quantification stage 3 cystocele (2019)        Status Post:  Procedure(s):  Davinci Lap SACROCOLPOPEXY possible open ROBOTIC ASSISTED     Plan:  Doing well and continue with treatment  Advance diet  Discharge home    Subjective:     Daily Progress Note: 2019 1:45 PM    Guille Hills is 1 Day Post-Op and doing satisfactory. She reports pain is well controlled. She has no complaints. She is tolerating clear liquids and ambulating with assistance. Patient is voiding without difficulty.     Objective:     Visit Vitals  BP (!) 113/36   Pulse 63   Temp 97.8 °F (36.6 °C)   Resp 16   Ht 5' (1.524 m)   Wt 132 lb (59.9 kg)   SpO2 99%   Breastfeeding No   BMI 25.78 kg/m²        Temp (24hrs), Av.6 °F (36.4 °C), Min:97.2 °F (36.2 °C), Max:98 °F (36.7 °C)      Intake and Output:   1901 -  0700  In: 2563.3 [I.V.:2563.3]  Out: 690 [Urine:935]   0701 -  1900  In: -   Out: 650 [Urine:650]    Physical Exam:   General appearance: alert, cooperative, no distress, appears stated age  Abdomen: ND    Incision: clean, dry and no drainage    Data Review:    Recent Results (from the past 24 hour(s))   METABOLIC PANEL, BASIC    Collection Time: 19  5:20 AM   Result Value Ref Range    Sodium 138 136 - 145 mmol/L    Potassium 4.3 3.5 - 5.5 mmol/L    Chloride 107 100 - 111 mmol/L    CO2 21 21 - 32 mmol/L    Anion gap 10 3.0 - 18 mmol/L    Glucose 87 74 - 99 mg/dL    BUN 18 7.0 - 18 MG/DL    Creatinine 0.59 (L) 0.6 - 1.3 MG/DL    BUN/Creatinine ratio 31 (H) 12 - 20      GFR est AA >60 >60 ml/min/1.73m2    GFR est non-AA >60 >60 ml/min/1.73m2    Calcium 8.4 (L) 8.5 - 10.1 MG/DL   HGB & HCT    Collection Time: 19  5:20 AM   Result Value Ref Range    HGB 12.8 12.0 - 16.0 g/dL    HCT 39.7 35.0 - 45.0 %         Signed By:     Aldo Earl MD   Urologic Oncologist  [facility] and JV Gus Munoz  Professor of Urology  [facility]  Office [phone] Ext [phone]                          November 27, 2019

## 2019-11-27 NOTE — ROUTINE PROCESS
R/T Arechiga Son. A/O. Abd with no changes.   Nause and pain better after meds Transferred on bed to 2203 stable condition

## 2019-11-27 NOTE — ROUTINE PROCESS
TRANSFER - IN REPORT:    Verbal report received from Mala Dang rn(name) on [facility]  being received from [employer]) for routine post - op      Report consisted of patients Situation, Background, Assessment and   Recommendations(SBAR). Information from the following report(s) SBAR, Kardex, Intake/Output, MAR and Recent Results was reviewed with the receiving nurse. Opportunity for questions and clarification was provided. Assessment  Will be completed upon patients arrival to unit and care assumed.

## 2019-11-27 NOTE — PROGRESS NOTES
Problem: Falls - Risk of  Goal: *Absence of Falls  Description  Document Gabe Aaron Fall Risk and appropriate interventions in the flowsheet. Outcome: Progressing Towards Goal  Note: Fall Risk Interventions:  Mobility Interventions: Patient to call before getting OOB, Utilize walker, cane, or other assistive device    Mentation Interventions: Update white board, Toileting rounds    Medication Interventions: Patient to call before getting OOB, Teach patient to arise slowly    Elimination Interventions: Bed/chair exit alarm, Call light in reach    History of Falls Interventions: Door open when patient unattended         Problem: Patient Education: Go to Patient Education Activity  Goal: Patient/Family Education  Outcome: Progressing Towards Goal     Problem: Surgical Pathway Day of Surgery  Goal: Off Pathway (Use only if patient is Off Pathway)  Outcome: Progressing Towards Goal  Goal: Activity/Safety  Outcome: Progressing Towards Goal  Goal: Consults, if ordered  Outcome: Progressing Towards Goal  Goal: Nutrition/Diet  Outcome: Progressing Towards Goal  Goal: Medications  Outcome: Progressing Towards Goal  Goal: Respiratory  Outcome: Progressing Towards Goal  Goal: Treatments/Interventions/Procedures  Outcome: Progressing Towards Goal  Goal: Psychosocial  Outcome: Progressing Towards Goal  Goal: *No signs and symptoms of infection or wound complications  Outcome: Progressing Towards Goal  Goal: *Optimal pain control at patient's stated goal  Outcome: Progressing Towards Goal  Goal: *Adequate urinary output (equal to or greater than 30 milliliters/hour)  Description  Ambulatory Surgery patients voiding without difficulty.   Outcome: Progressing Towards Goal  Goal: *Hemodynamically stable  Outcome: Progressing Towards Goal  Goal: *Tolerating diet  Outcome: Progressing Towards Goal  Goal: *Demonstrates progressive activity  Outcome: Progressing Towards Goal

## 2019-11-27 NOTE — ROUTINE PROCESS
I have reviewed discharge instructions with the patient. The patient verbalized understanding.   Waiting on daughter for

## 2019-11-27 NOTE — PROGRESS NOTES
Shift Progress Report:  Assumed care of patient in bed from PACU alert & oriented. Patient OOB c1 ambulated to door and back. Call bell within reach. VSS. Uneventful night, call bell within reach.   Patient Vitals for the past 12 hrs:   Temp Pulse Resp BP SpO2   11/27/19 0750 97.2 °F (36.2 °C) 89 16 121/58 100 %   11/27/19 0428 97.9 °F (36.6 °C) 93 16 107/62 97 %   11/27/19 0025 98 °F (36.7 °C) (!) 103 18 143/82 98 %   11/26/19 2031 97.4 °F (36.3 °C) 94 20 137/57 96 %

## 2019-11-27 NOTE — PROGRESS NOTES
conducted an initial consultation and Spiritual Assessment for [facility], who is a 78 y.o.,female. Patients Primary Language is: [place]. According to the patients EMR Congregational Affiliation is: [place]. The reason the Patient came to the hospital is:   Patient Active Problem List    Diagnosis Date Noted    Pelvic organ prolapse quantification stage 3 cystocele 11/26/2019        The  provided the following Interventions:  Initiated a relationship of care and support. Explored issues of kim, spirituality and/or Scientologist needs while hospitalized. Listened empathically. Provided chaplaincy education. Provided information about Spiritual Care Services. Offered prayer and assurance of continued prayers on patient's behalf. Chart reviewed. The following outcomes were achieved:  Patient shared some information about their medical narrative and spiritual journey/beliefs. Patient processed feeling about current hospitalization. Patient expressed gratitude for the 's visit. Assessment:  Patient did not indicate any spiritual or Scientologist issues which require Spiritual Care Services interventions at this time. Patient does not have any Scientologist/cultural needs that will affect patients preferences in health care. Plan:  Chaplains will continue to follow and will provide pastoral care on an as needed or requested basis.  recommends bedside caregivers page  on duty if patient shows signs of acute spiritual or emotional distress.     [address] [facility]   Staff [address] [facility]   [phone]

## 2019-11-27 NOTE — PROGRESS NOTES
Problem: Discharge Planning  Goal: *Discharge to safe environment  Outcome: Progressing Towards Goal   Plan: home        Pt with discharge orders. Pt states her daughter will provide transportation at discharge later today. Denies any concerns with plan to discharge home today. Care Management Interventions  PCP Verified by CM: Yes  Last Visit to PCP: 11/25/19  Mode of Transport at Discharge: Self(daughter will drive)  Transition of Care Consult (CM Consult): Discharge Planning  Current Support Network: Relative's Home(lives in daughter's home.   just moved here)  Confirm Follow Up Transport: Self  Plan discussed with Pt/Family/Caregiver: Yes  Discharge Location  Discharge Placement: Home

## 2019-11-27 NOTE — PROGRESS NOTES
0830  Received pt on bed, no vaginal bleeding, IS instructed. 1100  Assisted to the bathroom voided, small vaginal spotting noted. 1330  Assisted to the bathroom, voided again no blood, sitting on the recliner, seen byDr Tenorio. 1430  Offered pain med but wants only Tylenol at this time, voiding well.

## 2019-11-29 NOTE — ANESTHESIA POSTPROCEDURE EVALUATION
Procedure(s):  Davinci Lap SACROCOLPOPEXY possible open ROBOTIC ASSISTED.     general    Anesthesia Post Evaluation      Multimodal analgesia: multimodal analgesia used between 6 hours prior to anesthesia start to PACU discharge  Patient location during evaluation: bedside  Patient participation: complete - patient participated  Level of consciousness: awake  Pain management: adequate  Airway patency: patent  Anesthetic complications: no  Cardiovascular status: stable  Respiratory status: acceptable  Hydration status: acceptable  Post anesthesia nausea and vomiting:  controlled      Vitals Value Taken Time   /45 11/26/2019  7:50 PM   Temp 36.3 °C (97.3 °F) 11/26/2019  7:01 PM   Pulse 85 11/26/2019  7:50 PM   Resp 18 11/26/2019  7:50 PM   SpO2 95 % 11/26/2019  7:50 PM

## 2021-03-09 ENCOUNTER — OFFICE VISIT (OUTPATIENT)
Dept: ORTHOPEDIC SURGERY | Age: 81
End: 2021-03-09
Payer: MEDICARE

## 2021-03-09 VITALS
BODY MASS INDEX: 25.3 KG/M2 | TEMPERATURE: 96.9 F | DIASTOLIC BLOOD PRESSURE: 78 MMHG | HEIGHT: 61 IN | OXYGEN SATURATION: 99 % | SYSTOLIC BLOOD PRESSURE: 176 MMHG | HEART RATE: 78 BPM | RESPIRATION RATE: 18 BRPM | WEIGHT: 134 LBS

## 2021-03-09 DIAGNOSIS — M17.12 PRIMARY OSTEOARTHRITIS OF LEFT KNEE: Primary | ICD-10-CM

## 2021-03-09 DIAGNOSIS — M25.562 LEFT KNEE PAIN, UNSPECIFIED CHRONICITY: ICD-10-CM

## 2021-03-09 PROCEDURE — G8419 CALC BMI OUT NRM PARAM NOF/U: HCPCS | Performed by: ORTHOPAEDIC SURGERY

## 2021-03-09 PROCEDURE — 1101F PT FALLS ASSESS-DOCD LE1/YR: CPT | Performed by: ORTHOPAEDIC SURGERY

## 2021-03-09 PROCEDURE — G8510 SCR DEP NEG, NO PLAN REQD: HCPCS | Performed by: ORTHOPAEDIC SURGERY

## 2021-03-09 PROCEDURE — G8400 PT W/DXA NO RESULTS DOC: HCPCS | Performed by: ORTHOPAEDIC SURGERY

## 2021-03-09 PROCEDURE — 20611 DRAIN/INJ JOINT/BURSA W/US: CPT | Performed by: ORTHOPAEDIC SURGERY

## 2021-03-09 PROCEDURE — 99203 OFFICE O/P NEW LOW 30 MIN: CPT | Performed by: ORTHOPAEDIC SURGERY

## 2021-03-09 PROCEDURE — G8536 NO DOC ELDER MAL SCRN: HCPCS | Performed by: ORTHOPAEDIC SURGERY

## 2021-03-09 PROCEDURE — G8427 DOCREV CUR MEDS BY ELIG CLIN: HCPCS | Performed by: ORTHOPAEDIC SURGERY

## 2021-03-09 PROCEDURE — 1090F PRES/ABSN URINE INCON ASSESS: CPT | Performed by: ORTHOPAEDIC SURGERY

## 2021-03-09 PROCEDURE — 73560 X-RAY EXAM OF KNEE 1 OR 2: CPT | Performed by: ORTHOPAEDIC SURGERY

## 2021-03-09 RX ORDER — TRIAMCINOLONE ACETONIDE 40 MG/ML
40 INJECTION, SUSPENSION INTRA-ARTICULAR; INTRAMUSCULAR ONCE
Status: COMPLETED | OUTPATIENT
Start: 2021-03-09 | End: 2021-03-09

## 2021-03-09 RX ADMIN — TRIAMCINOLONE ACETONIDE 40 MG: 40 INJECTION, SUSPENSION INTRA-ARTICULAR; INTRAMUSCULAR at 09:14

## 2021-03-09 NOTE — PROGRESS NOTES
Carlos Bailey  1940   Chief Complaint   Patient presents with    Knee Pain     left knee pain        HISTORY OF PRESENT ILLNESS  Carlos Bailey is a 78 y.o. female who presents today for evaluation of left knee pain. She rates her pain 1/10 today. Pain has been present for years, worsened over the last year. Was diagnosed with meniscus tear around 5 years ago. Pain with walking. Pt likes to walk for exercise but pain limits her with this. Patient describes the pain as aching and sharp that is Constant in nature. Symptoms are worse with walking, exercising on recumbent bike, Activity and is better with  Rest. Associated symptoms include nothing. Since problem started, it: has worsened. Pain does not wake patient up at night. Has taken no meds for the problem. Has tried following treatments: Injections:NO; Brace:NO;  Therapy:NO; Cane/Crutch:NO       Allergies   Allergen Reactions    Latex Rash    Iodinated Contrast Media Hives    Penicillins Hives    Aspirin Unknown (comments)    Black Pepper Rash    Iodine Unknown (comments)    Nitrofurantoin Hives    Tamsulosin Unknown (comments)    Wheat Itching    Yeast Swelling        Past Medical History:   Diagnosis Date    Female cystocele     asymptomatic    Hypothyroidism 11/09/2015    Mixed hyperlipidemia 01/14/2016    Subdural hematoma, post-traumatic ([facility] [address] 1985    Urinary incontinence       Social History     Socioeconomic History    Marital status: LEGALLY      Spouse name: Not on file    Number of children: Not on file    Years of education: Not on file    Highest education level: Not on file   Occupational History    Not on file   Social Needs    Financial resource strain: Not on file    Food insecurity     Worry: Not on file     Inability: Not on file    Transportation needs     Medical: Not on file     Non-medical: Not on file   Tobacco Use    Smoking status: Former Smoker    Smokeless tobacco: Never Used   Substance and Sexual Activity    Alcohol use: Not Currently     Frequency: Never    Drug use: Never    Sexual activity: Not on file   Lifestyle    Physical activity     Days per week: Not on file     Minutes per session: Not on file    Stress: Not on file   Relationships    Social connections     Talks on phone: Not on file     Gets together: Not on file     Attends Confucianism service: Not on file     Active member of club or organization: Not on file     Attends meetings of clubs or organizations: Not on file     Relationship status: Not on file    Intimate partner violence     Fear of current or ex partner: Not on file     Emotionally abused: Not on file     Physically abused: Not on file     Forced sexual activity: Not on file   Other Topics Concern    Not on file   Social History Narrative    Not on file      Past Surgical History:   Procedure Laterality Date    HX CARPAL TUNNEL RELEASE      HX CATARACT REMOVAL Bilateral 11/2011; 10/2011    both    HX CRANIOTOMY  1985    HX HYSTERECTOMY      HX OTHER SURGICAL  04/2010    R finger , 3rd finger Trigger release    HX OTHER SURGICAL  11/2009    Right hand excision Lipoma Dr. Tj Toledo      Family History   Problem Relation Age of Onset    Coronary Artery Disease Mother     Cancer Father         malignant neoplastic disease(brain)    Coronary Artery Disease Sister     Other Brother         neurological finding      Current Outpatient Medications   Medication Sig    multivitamin (ONE A DAY) tablet Take 1 Tab by mouth daily.  cyanocobalamin (VITAMIN B12) 1,000 mcg/mL injection 1,000 mcg by IntraMUSCular route once.  pyridoxine, vitamin B6, (VITAMIN B-6) 100 mg tablet Take 100 mg by mouth daily.  magnesium 250 mg tab Take  by mouth.  OTHER gastrocom    OTHER absorbaide    OTHER thyrosol    Biotin 2,500 mcg cap Take  by mouth.  OTHER Eye support    OTHER prevagen    cholecalciferol, vitamin D3, (VITAMIN D3) 2,000 unit tab Take  by mouth.     SYNTHROID 50 mcg tablet Take 50 mcg by mouth Daily (before breakfast). No current facility-administered medications for this visit. REVIEW OF SYSTEM   Patient denies: Weight loss, Fever/Chills, HA, Visual changes, Fatigue, Chest pain, SOB, Abdominal pain, N/V/D/C, Blood in stool or urine, Edema. Pertinent positive as above in HPI. All others were negative    PHYSICAL EXAM:   Visit Vitals  BP (!) 176/78 (BP 1 Location: Right upper arm, BP Patient Position: Sitting, BP Cuff Size: Large adult)   Pulse 78   Temp 96.9 °F (36.1 °C) (Temporal)   Resp 18   Ht 5' 1\" (1.549 m)   Wt 134 lb (60.8 kg)   SpO2 99%   BMI 25.32 kg/m²     The patient is a well-developed, well-nourished female   in no acute distress. The patient is alert and oriented times three. The patient is alert and oriented times three. Mood and affect are normal.  LYMPHATIC: lymph nodes are not enlarged and are within normal limits  SKIN: normal in color and non tender to palpation. There are no bruises or abrasions noted. NEUROLOGICAL: Motor sensory exam is within normal limits. Reflexes are equal bilaterally. There is normal sensation to pinprick and light touch  MUSCULOSKELETAL:  Examination Left knee   Skin Intact   Range of motion    Effusion +   Medial joint line tenderness +   Lateral joint line tenderness +   Tenderness Pes Bursa -   Tenderness insertion MCL -   Tenderness insertion LCL -   Marianos -   Patella crepitus +   Patella grind +   Lachman -   Pivot shift -   Anterior drawer -   Posterior drawer -   Varus stress -   Valgus stress -   Neurovascular Intact   Calf Swelling and Tenderness to Palpation -   Kristy's Test -   Hamstring Cord Tightness -       PROCEDURE:  Left Knee Injection with Ultrasound Guidance    Indication:Left Knee pain/swelling    After sterile prep, 4 cc of Xylocaine and 1 cc of Kenalog were injected into the left Knee.  Intra-articular Ultrasound images captured using [address] using a frequency of 10 MHz with a linear transducer and scanned into patient's chart. [facility] [place]  OFFICE PROCEDURE PROGRESS NOTE        Chart reviewed for the following:  Fredna Cooks, M.D, have reviewed the History, Physical and updated the Allergic reactions for Edgar Choi performed immediately prior to start of procedure:  Fredna Cooks, M.D, have performed the following reviews on [facility] prior to the start of the procedure:            * Patient was identified by name and date of birth   * Agreement on procedure being performed was verified  * Risks and Benefits explained to the patient  * Procedure site verified and marked as necessary  * Patient was positioned for comfort  * Needle placement confirmed by ultrasound  * Consent was signed and verified     Time: 9:06 AM     Date of procedure: 3/9/2021    Procedure performed by:  Sherice Baum M.D    Provider assisted by: (see medication administration)    How tolerated by patient: tolerated the procedure well with no complications    Comments: none      IMAGING: XR of left knee with 2 views obtained in the office dated 3/09/2021 was reviewed and read by Dr. Meka Laura: Marked degenerative arthritis in all 3 compartments with marked varus angulation      IMPRESSION:      ICD-10-CM ICD-9-CM    1. Primary osteoarthritis of left knee  M17.12 715.16 triamcinolone acetonide (KENALOG-40) 40 mg/mL injection 40 mg      ARTHROCENTESIS ASPIR&/INJ MAJOR JT/BURSA W/US   2. Left knee pain, unspecified chronicity  M25.562 719.46 AMB POC XRAY, KNEE; 1/2 VIEWS        PLAN:  1. Pt presents today with left knee pain due to primary OA and I would like to try an injection today. Risk factors include: n/a   2. No ultrasound exam indicated today  3. Yes cortisone injection indicated today L KNEE US  4. No Physical/Occupational Therapy indicated today  5.  No diagnostic test indicated today: 6. No durable medical equipment indicated today  7. No referral indicated today   8. No medications indicated today:   9. No Narcotic indicated today      RTC 3 weeks if pain continues      Scribed by Avinash Ríos [address] [place] [address] [address]) as dictated by Fabio Prince MD    I, Dr. Fabio Prince, confirm that all documentation is accurate.     Fabio Prince M.D.   [address] [place] [address] and Spine Specialist

## 2021-03-24 ENCOUNTER — OFFICE VISIT (OUTPATIENT)
Dept: ORTHOPEDIC SURGERY | Age: 81
End: 2021-03-24
Payer: MEDICARE

## 2021-03-24 VITALS
HEIGHT: 61 IN | TEMPERATURE: 96.4 F | WEIGHT: 135 LBS | BODY MASS INDEX: 25.49 KG/M2 | HEART RATE: 70 BPM | OXYGEN SATURATION: 97 %

## 2021-03-24 DIAGNOSIS — M17.12 PRIMARY OSTEOARTHRITIS OF LEFT KNEE: Primary | ICD-10-CM

## 2021-03-24 PROCEDURE — G8432 DEP SCR NOT DOC, RNG: HCPCS | Performed by: ORTHOPAEDIC SURGERY

## 2021-03-24 PROCEDURE — G8419 CALC BMI OUT NRM PARAM NOF/U: HCPCS | Performed by: ORTHOPAEDIC SURGERY

## 2021-03-24 PROCEDURE — G8427 DOCREV CUR MEDS BY ELIG CLIN: HCPCS | Performed by: ORTHOPAEDIC SURGERY

## 2021-03-24 PROCEDURE — G8400 PT W/DXA NO RESULTS DOC: HCPCS | Performed by: ORTHOPAEDIC SURGERY

## 2021-03-24 PROCEDURE — 99213 OFFICE O/P EST LOW 20 MIN: CPT | Performed by: ORTHOPAEDIC SURGERY

## 2021-03-24 PROCEDURE — 1090F PRES/ABSN URINE INCON ASSESS: CPT | Performed by: ORTHOPAEDIC SURGERY

## 2021-03-24 PROCEDURE — 1101F PT FALLS ASSESS-DOCD LE1/YR: CPT | Performed by: ORTHOPAEDIC SURGERY

## 2021-03-24 PROCEDURE — G8536 NO DOC ELDER MAL SCRN: HCPCS | Performed by: ORTHOPAEDIC SURGERY

## 2021-03-24 NOTE — PROGRESS NOTES
Finesse Tidwell  1940   Chief Complaint   Patient presents with    Knee Pain     left        HISTORY OF PRESENT ILLNESS  Finesse Tidwell is a 78 y.o. female who presents today for reevaluation of left knee. Patient rates pain as 7/10 today. Pain has been present for years, worsened over the last year. Was diagnosed with meniscus tear around 5 years ago. Pain with walking. Pt likes to walk for exercise but pain limits her with this. At last OV on 3/09/2021, patient had a left knee cortisone injection which only provided relief for a few days. Patient denies any fever, chills, chest pain, shortness of breath or calf pain. The remainder of the review of systems is negative. There are no new illness or injuries to report since last seen in the office. There are no changes to medications, allergies, family or social history. PHYSICAL EXAM:   Visit Vitals  Pulse 70   Temp (!) 96.4 °F (35.8 °C)   Ht 5' 1\" (1.549 m)   Wt 135 lb (61.2 kg)   SpO2 97%   BMI 25.51 kg/m²     The patient is a well-developed, well-nourished female   in no acute distress. The patient is alert and oriented times three. The patient is alert and oriented times three. Mood and affect are normal.  LYMPHATIC: lymph nodes are not enlarged and are within normal limits  SKIN: normal in color and non tender to palpation. There are no bruises or abrasions noted. NEUROLOGICAL: Motor sensory exam is within normal limits. Reflexes are equal bilaterally.  There is normal sensation to pinprick and light touch  MUSCULOSKELETAL:  Examination Left knee   Skin Intact   Range of motion    Effusion +   Medial joint line tenderness +   Lateral joint line tenderness +   Tenderness Pes Bursa -   Tenderness insertion MCL -   Tenderness insertion LCL -   Marianos -   Patella crepitus +   Patella grind +   Lachman -   Pivot shift -   Anterior drawer -   Posterior drawer -   Varus stress -   Valgus stress -   Neurovascular Intact   Calf Swelling and Tenderness to Palpation -   Kristy's Test -   Hamstring Cord Tightness -        IMAGING: XR of left knee with 2 views obtained in the office dated 3/09/2021 was reviewed and read by Dr. Beto Stringer: Marked degenerative arthritis in all 3 compartments with marked varus angulation      IMPRESSION:      ICD-10-CM ICD-9-CM    1. Primary osteoarthritis of left knee  M17.12 715.16 PROCEDURE AUTHORIZATION TO         PLAN:   1. Pt presents today with left knee pain due to primary OA and the cortisone injection given at last OV did not provide lasting relief. Will proceed with Euflexxa authorization for the left knee. Risk factors include: n/a  2. No ultrasound exam indicated today  3. No cortisone injection indicated today   4. No Physical/Occupational Therapy indicated today  5. No diagnostic test indicated today:   6. No durable medical equipment indicated today  7. No referral indicated today   8. No medications indicated today:   9. No Narcotic indicated today       RTC following Euflexxa auth      Scribed by Latoya Noriega [address] [facility] [address] as dictated by Allyn Avendano MD    I, Dr. Allyn Avendano, confirm that all documentation is accurate.     Allyn Avendano M.D.   [address] and Spine Specialist

## 2021-04-05 ENCOUNTER — TELEPHONE (OUTPATIENT)
Dept: ORTHOPEDIC SURGERY | Age: 81
End: 2021-04-05

## 2021-04-05 NOTE — TELEPHONE ENCOUNTER
Spoke with patient and answered questions. Discuss information pamphlet with patient. Also informed her we will leave pamphlet up front for her is she would like to pick it up.

## 2021-04-05 NOTE — TELEPHONE ENCOUNTER
Someone please give her info on injections. Soreness that day. Limit activities that day. Next day all activities as tolerated.

## 2021-04-05 NOTE — TELEPHONE ENCOUNTER
Patient has a couple questions regarding her upcoming Orthovisc injections tomorrow. She wants to ask about possible allergies and what she should expect from these injections. Please return call.       [phone] Patient

## 2021-04-06 ENCOUNTER — OFFICE VISIT (OUTPATIENT)
Dept: ORTHOPEDIC SURGERY | Age: 81
End: 2021-04-06
Payer: MEDICARE

## 2021-04-06 VITALS
OXYGEN SATURATION: 96 % | RESPIRATION RATE: 16 BRPM | HEIGHT: 61 IN | WEIGHT: 134 LBS | HEART RATE: 98 BPM | BODY MASS INDEX: 25.3 KG/M2 | TEMPERATURE: 98.2 F

## 2021-04-06 DIAGNOSIS — M17.12 PRIMARY OSTEOARTHRITIS OF LEFT KNEE: Primary | ICD-10-CM

## 2021-04-06 PROCEDURE — 20611 DRAIN/INJ JOINT/BURSA W/US: CPT | Performed by: ORTHOPAEDIC SURGERY

## 2021-04-06 NOTE — PROGRESS NOTES
Patient: Tracy Cano                MRN: [id number]       SSN: [id number]  YOB: 1940        AGE: 78 y.o. SEX: female  Body mass index is 25.32 kg/m². PCP: Andrés Carson MD  04/06/21    Chief Complaint   Patient presents with    Knee Pain     LEFT KNEE       HISTORY:  Tracy Cano is a 78 y.o. female who is seen for reevaluation of Left knee and here for 1st injection of Orthovisc. PROCEDURE:  Under ultrasound guidance, patient's Left knee, after timeout under sterile conditions, was injected with 2 cc of Orthovisc. Intra-articular. Ultrasound images captured using [employer] Loop Ultrasound machine using a frequency of 10 MHz with a linear transducer and scanned into patient's chart. [facility] - [facility]  OFFICE PROCEDURE PROGRESS NOTE        Chart reviewed for the following:   Korin Mccabe MD, have reviewed the History, Physical and updated the Allergic reactions for Edgar Choi performed immediately prior to start of procedure:   Korin Mccabe MD, have performed the following reviews on Quillian Oyster prior to the start of the procedure:            * Patient was identified by name and date of birth   * Agreement on procedure being performed was verified  * Risks and Benefits explained to the patient  * Procedure site verified and marked as necessary  * Patient was positioned for comfort  * Needle placement confirmed by ultrasound  * Consent was signed and verified     Time: 9:26 AM       Date of procedure: 4/6/2021    Procedure performed by:  Tabatha Hodges MD    Provider assisted by: None     How tolerated by patient: tolerated the procedure well with no complications    Comments: none    IMPRESSION:     ICD-10-CM ICD-9-CM    1.  Primary osteoarthritis of left knee  M17.12 715.16 sodium hyaluronate (viscosup) (ORTHOVISC) 30 mg/2 mL injection syrg 30 mg      ARTHROCENTESIS ASPIR&/INJ MAJOR YAEL/SHARON W/US        PLAN:  Ms. Kang Steven will return in one week for her second Orthovisc injection. Scribed by Ev Weiss) as dictated by MD IRAM Sierra, Dr. Demetrius Contreras, confirm that all documentation is accurate.     Demetrius Contreras M.D.   [facility]

## 2021-04-08 ENCOUNTER — TELEPHONE (OUTPATIENT)
Dept: ORTHOPEDIC SURGERY | Age: 81
End: 2021-04-08

## 2021-04-08 NOTE — TELEPHONE ENCOUNTER
Spoke with patient, she states her knee is more painful than before and she is just wondering if \"it is going to get worse before it gets better\". Notified patient that typically patient's don't receive much relief from the first injection and that it can sometimes take up to 6 weeks from the last injection of the series to get the full affect from the medication. Patient verbalized understanding and stated that was encouraging. Notified patient to call our office back if she has any further issues or concerns.

## 2021-04-08 NOTE — TELEPHONE ENCOUNTER
Patient [phone]    Patient is requesting a return call to discuss her pain level in her left knee. She received her first Orthovisc injection on Tuesday, 04/06.

## 2021-04-12 NOTE — PROGRESS NOTES
Patient: Arianna Costa                MRN: [id number]       SSN: [id number]  YOB: 1940        AGE: 78 y.o. SEX: female  There is no height or weight on file to calculate BMI. PCP: Yeimy Zheng MD  04/12/21    No chief complaint on file. HISTORY:  Arianna Costa is a 78 y.o. female who is seen for reevaluation of Left knee and here for 2nd injection of Orthovisc. PROCEDURE:  Under ultrasound guidance, patient's Left knee, after timeout under sterile conditions, was injected with 2 cc of Orthovisc. [facility] - [facility]  OFFICE PROCEDURE PROGRESS NOTE        Chart reviewed for the following:   Jayesh Gonzalez MD, have reviewed the History, Physical and updated the Allergic reactions for Edgar Choi performed immediately prior to start of procedure:   Jayesh Gonzalez MD, have performed the following reviews on Arianna Costa prior to the start of the procedure:            * Patient was identified by name and date of birth   * Agreement on procedure being performed was verified  * Risks and Benefits explained to the patient  * Procedure site verified and marked as necessary  * Patient was positioned for comfort  * Consent was signed and verified     Time: 2:45 PM       Date of procedure: 4/12/2021    Procedure performed by:  Yaritza Parrish MD    Provider assisted by: None     How tolerated by patient: tolerated the procedure well with no complications    Comments: none    IMPRESSION: No diagnosis found. PLAN:  Ms. Sunny Maya will return in one week for her second Orthovisc injection. Scribed by Devon Cui ([address] [place] [address] [address]) as dictated by Yaritza Parrish MD    I, Dr. Yaritza Parrish, confirm that all documentation is accurate.     Yaritza Parrish M.D.   [facility]

## 2021-04-13 ENCOUNTER — OFFICE VISIT (OUTPATIENT)
Dept: ORTHOPEDIC SURGERY | Age: 81
End: 2021-04-13
Payer: MEDICARE

## 2021-04-13 VITALS
BODY MASS INDEX: 25.3 KG/M2 | OXYGEN SATURATION: 96 % | WEIGHT: 134 LBS | HEART RATE: 70 BPM | RESPIRATION RATE: 16 BRPM | HEIGHT: 61 IN

## 2021-04-13 DIAGNOSIS — M17.12 PRIMARY OSTEOARTHRITIS OF LEFT KNEE: Primary | ICD-10-CM

## 2021-04-13 PROCEDURE — 20611 DRAIN/INJ JOINT/BURSA W/US: CPT | Performed by: ORTHOPAEDIC SURGERY

## 2021-04-20 ENCOUNTER — OFFICE VISIT (OUTPATIENT)
Dept: ORTHOPEDIC SURGERY | Age: 81
End: 2021-04-20
Payer: MEDICARE

## 2021-04-20 VITALS
OXYGEN SATURATION: 95 % | BODY MASS INDEX: 25.41 KG/M2 | WEIGHT: 134.6 LBS | HEART RATE: 46 BPM | TEMPERATURE: 97 F | HEIGHT: 61 IN

## 2021-04-20 DIAGNOSIS — M17.12 PRIMARY OSTEOARTHRITIS OF LEFT KNEE: Primary | ICD-10-CM

## 2021-04-20 PROCEDURE — 20611 DRAIN/INJ JOINT/BURSA W/US: CPT | Performed by: ORTHOPAEDIC SURGERY

## 2021-04-20 NOTE — PROGRESS NOTES
Patient: Ashleigh Rivas                MRN: [id number]       SSN: [id number]  YOB: 1940        AGE: 78 y.o. SEX: female  Body mass index is 25.43 kg/m². PCP: Rigo Jacobs MD  04/20/21    Chief Complaint   Patient presents with    Knee Pain     left knee       HISTORY:  Ashleigh Rivas is a 78 y.o. female who is seen for reevaluation of Left knee and here for 3rd and final injection of Orthovisc. PROCEDURE:  Under ultrasound guidance, patient's Left knee, after timeout under sterile conditions, was injected with 2 cc of Orthovisc. Intra-articular. Ultrasound images captured using [employer] Loop Ultrasound machine using a frequency of 10 MHz with a linear transducer and scanned into patient's chart. [facility] - [facility]  OFFICE PROCEDURE PROGRESS NOTE        Chart reviewed for the following:   Ramy Thompson MD, have reviewed the History, Physical and updated the Allergic reactions for [facility] performed immediately prior to start of procedure:   Ramy Thompson MD, have performed the following reviews on Ashleigh Rivas prior to the start of the procedure:            * Patient was identified by name and date of birth   * Agreement on procedure being performed was verified  * Risks and Benefits explained to the patient  * Procedure site verified and marked as necessary  * Patient was positioned for comfort  * Needle placement confirmed by ultrasound  * Consent was signed and verified     Time: 10:14 AM       Date of procedure: 4/20/2021    Procedure performed by:  Fazal Chatman MD    Provider assisted by: None     How tolerated by patient: tolerated the procedure well with no complications    Comments: none    IMPRESSION:     ICD-10-CM ICD-9-CM    1.  Primary osteoarthritis of left knee  M17.12 715.16 sodium hyaluronate (viscosup) (ORTHOVISC) 30 mg/2 mL injection syrg 30 mg      ARTHROCENTESIS ASPIR&/INJ MAJOR JT/BURSA W/US        PLAN: Ms. Collin Son has completed her Orthovisc injection series. she will return as needed. Scribed by Addie Jaimes) as dictated by Tabatha Hodges MD    I, Dr. Tabatha Hodges, confirm that all documentation is accurate.     Tabatha Hodges M.D.   [facility]

## 2021-04-26 NOTE — PROGRESS NOTES
Patient: Aldo Garcia                MRN: [id number]       SSN: [id number]  YOB: 1940        AGE: 78 y.o. SEX: female  Body mass index is 25.32 kg/m². PCP: Elgin Boone MD  04/27/21    Chief Complaint   Patient presents with    Knee Pain     LEFT KNEE #4 INJECTION        HISTORY:  Aldo Garcia is a 78 y.o. female who is seen for reevaluation of Left knee and here for 4th and final injection of Orthovisc. PROCEDURE:  Under ultrasound guidance, patient's Left knee, after timeout under sterile conditions, was injected with 2 cc of Orthovisc. Intra-articular. Ultrasound images captured using [employer] Loop Ultrasound machine using a frequency of 10 MHz with a linear transducer and scanned into patient's chart. [facility] - [facility]  OFFICE PROCEDURE PROGRESS NOTE        Chart reviewed for the following:   Tito Layton MD, have reviewed the History, Physical and updated the Allergic reactions for Edgar Choi performed immediately prior to start of procedure:   Tito Layton MD, have performed the following reviews on Aldo Garcia prior to the start of the procedure:            * Patient was identified by name and date of birth   * Agreement on procedure being performed was verified  * Risks and Benefits explained to the patient  * Procedure site verified and marked as necessary  * Patient was positioned for comfort  * Needle placement confirmed by ultrasound  * Consent was signed and verified     Time: 3:54 PM       Date of procedure: 4/27/2021    Procedure performed by:  Asha Phillip MD    Provider assisted by: None     How tolerated by patient: tolerated the procedure well with no complications    Comments: none    IMPRESSION:     ICD-10-CM ICD-9-CM    1.  Primary osteoarthritis of left knee  M17.12 715.16 sodium hyaluronate (viscosup) (ORTHOVISC) 30 mg/2 mL injection syrg 30 mg ARTHROCENTESIS ASPIR&/INJ MAJOR JT/BURSA W/US        PLAN: Ms. Jelena Rojas has completed her Orthovisc injection series. she will return as needed. Scribed by Angeline Richardson [address]) as dictated by Jenna Mitchell MD    I, Dr. Jenna Mitchell, confirm that all documentation is accurate.     Jenna Mitchell M.D.   [facility]

## 2021-04-27 ENCOUNTER — OFFICE VISIT (OUTPATIENT)
Dept: ORTHOPEDIC SURGERY | Age: 81
End: 2021-04-27
Payer: MEDICARE

## 2021-04-27 VITALS
RESPIRATION RATE: 16 BRPM | HEART RATE: 77 BPM | WEIGHT: 134 LBS | OXYGEN SATURATION: 98 % | HEIGHT: 61 IN | BODY MASS INDEX: 25.3 KG/M2

## 2021-04-27 DIAGNOSIS — M17.12 PRIMARY OSTEOARTHRITIS OF LEFT KNEE: Primary | ICD-10-CM

## 2021-04-27 PROCEDURE — 20611 DRAIN/INJ JOINT/BURSA W/US: CPT | Performed by: ORTHOPAEDIC SURGERY

## 2021-06-09 ENCOUNTER — OFFICE VISIT (OUTPATIENT)
Dept: ORTHOPEDIC SURGERY | Age: 81
End: 2021-06-09
Payer: MEDICARE

## 2021-06-09 VITALS
HEART RATE: 86 BPM | BODY MASS INDEX: 26.5 KG/M2 | HEIGHT: 60 IN | WEIGHT: 135 LBS | TEMPERATURE: 97.1 F | OXYGEN SATURATION: 98 % | RESPIRATION RATE: 16 BRPM

## 2021-06-09 DIAGNOSIS — M17.12 PRIMARY OSTEOARTHRITIS OF LEFT KNEE: Primary | ICD-10-CM

## 2021-06-09 PROCEDURE — G8419 CALC BMI OUT NRM PARAM NOF/U: HCPCS | Performed by: ORTHOPAEDIC SURGERY

## 2021-06-09 PROCEDURE — G8400 PT W/DXA NO RESULTS DOC: HCPCS | Performed by: ORTHOPAEDIC SURGERY

## 2021-06-09 PROCEDURE — 1101F PT FALLS ASSESS-DOCD LE1/YR: CPT | Performed by: ORTHOPAEDIC SURGERY

## 2021-06-09 PROCEDURE — 1090F PRES/ABSN URINE INCON ASSESS: CPT | Performed by: ORTHOPAEDIC SURGERY

## 2021-06-09 PROCEDURE — G8536 NO DOC ELDER MAL SCRN: HCPCS | Performed by: ORTHOPAEDIC SURGERY

## 2021-06-09 PROCEDURE — G8432 DEP SCR NOT DOC, RNG: HCPCS | Performed by: ORTHOPAEDIC SURGERY

## 2021-06-09 PROCEDURE — G8427 DOCREV CUR MEDS BY ELIG CLIN: HCPCS | Performed by: ORTHOPAEDIC SURGERY

## 2021-06-09 PROCEDURE — 99212 OFFICE O/P EST SF 10 MIN: CPT | Performed by: ORTHOPAEDIC SURGERY

## 2021-06-09 NOTE — PROGRESS NOTES
Brad Dimas  1940   Chief Complaint   Patient presents with    Knee Pain     left knee f/u         HISTORY OF PRESENT ILLNESS  Brad Dimas is a 78 y.o. female who presents today for reevaluation of left knee. Patient rates pain as 6/10 today. Pain has been present for years, worsened over the last year. She finished a series of Orthovisc injections for the left knee on 4/27/2021 which provided minimal relief. Still having pain. Was diagnosed with meniscus tear around 5 years ago. Pain with walking. Pt likes to walk for exercise but pain limits her with this. The patient had a left knee cortisone injection on 3/09/2021 which only provided relief for a few days. Patient denies any fever, chills, chest pain, shortness of breath or calf pain. The remainder of the review of systems is negative. There are no new illness or injuries to report since last seen in the office. There are no changes to medications, allergies, family or social history. Pain Assessment  6/9/2021   Location of Pain Knee   Location Modifiers Left   Severity of Pain 6   Quality of Pain Dull;Aching   Quality of Pain Comment -   Duration of Pain A few hours   Frequency of Pain Intermittent   Aggravating Factors Standing;Walking   Aggravating Factors Comment -   Limiting Behavior -   Relieving Factors Rest;Elevation   Relieving Factors Comment -   Result of Injury No          PHYSICAL EXAM:   Visit Vitals  Pulse 86   Temp 97.1 °F (36.2 °C) (Temporal)   Resp 16   Ht 5' (1.524 m)   Wt 135 lb (61.2 kg)   SpO2 98%   BMI 26.37 kg/m²     The patient is a well-developed, well-nourished female   in no acute distress. The patient is alert and oriented times three. The patient is alert and oriented times three. Mood and affect are normal.  LYMPHATIC: lymph nodes are not enlarged and are within normal limits  SKIN: normal in color and non tender to palpation. There are no bruises or abrasions noted.    NEUROLOGICAL: Motor sensory exam is within normal limits. Reflexes are equal bilaterally. There is normal sensation to pinprick and light touch  MUSCULOSKELETAL:  Examination Left knee   Skin Intact   Range of motion    Effusion +   Medial joint line tenderness +   Lateral joint line tenderness +   Tenderness Pes Bursa -   Tenderness insertion MCL -   Tenderness insertion LCL -   Marianos -   Patella crepitus +   Patella grind +   Lachman -   Pivot shift -   Anterior drawer -   Posterior drawer -   Varus stress -   Valgus stress -   Neurovascular Intact   Calf Swelling and Tenderness to Palpation -   Kristy's Test -   Hamstring Cord Tightness -       IMAGING: XR of left knee with 2 views obtained in the office dated 3/09/2021 was reviewed and read by Dr. Adorno Draft: Marked degenerative arthritis in all 3 compartments with marked varus angulation      IMPRESSION:      ICD-10-CM ICD-9-CM    1. Primary osteoarthritis of left knee  M17.12 715.16         PLAN:   1. Pt presents today with left knee pain due to primary OA and has failed Orthovisc and cortisone injections. I recommended to the patient that she follow up with Dr. Yi Marshall for a surgical consult for a knee replacement. Risk factors include: n/a  2. No ultrasound exam indicated today  3. No cortisone injection indicated today   4. No Physical/Occupational Therapy indicated today  5. No diagnostic test indicated today:   6. No durable medical equipment indicated today  7. Yes referral indicated today DR BULLARD  8. No medications indicated today:   9. No Narcotic indicated today       RTC prn      Scribed by [address]) as dictated by Carlos Rivas MD    I, Dr. Carlos Rivas, confirm that all documentation is accurate.     Carlos Rivas M.D.   [facility]

## 2021-06-10 ENCOUNTER — OFFICE VISIT (OUTPATIENT)
Dept: ORTHOPEDIC SURGERY | Age: 81
End: 2021-06-10
Payer: MEDICARE

## 2021-06-10 VITALS
HEART RATE: 74 BPM | BODY MASS INDEX: 25.72 KG/M2 | OXYGEN SATURATION: 98 % | TEMPERATURE: 97.7 F | WEIGHT: 131 LBS | HEIGHT: 60 IN

## 2021-06-10 DIAGNOSIS — G89.29 CHRONIC PAIN OF LEFT KNEE: Primary | ICD-10-CM

## 2021-06-10 DIAGNOSIS — Z86.718 H/O BLOOD CLOTS: ICD-10-CM

## 2021-06-10 DIAGNOSIS — Z86.718 HISTORY OF BLOOD CLOT IN BRAIN: ICD-10-CM

## 2021-06-10 DIAGNOSIS — M25.562 CHRONIC PAIN OF LEFT KNEE: Primary | ICD-10-CM

## 2021-06-10 DIAGNOSIS — M81.0 AGE-RELATED OSTEOPOROSIS WITHOUT CURRENT PATHOLOGICAL FRACTURE: ICD-10-CM

## 2021-06-10 DIAGNOSIS — M17.12 PRIMARY OSTEOARTHRITIS OF LEFT KNEE: ICD-10-CM

## 2021-06-10 PROCEDURE — 1101F PT FALLS ASSESS-DOCD LE1/YR: CPT | Performed by: ORTHOPAEDIC SURGERY

## 2021-06-10 PROCEDURE — G8427 DOCREV CUR MEDS BY ELIG CLIN: HCPCS | Performed by: ORTHOPAEDIC SURGERY

## 2021-06-10 PROCEDURE — G8432 DEP SCR NOT DOC, RNG: HCPCS | Performed by: ORTHOPAEDIC SURGERY

## 2021-06-10 PROCEDURE — 99214 OFFICE O/P EST MOD 30 MIN: CPT | Performed by: ORTHOPAEDIC SURGERY

## 2021-06-10 PROCEDURE — G8536 NO DOC ELDER MAL SCRN: HCPCS | Performed by: ORTHOPAEDIC SURGERY

## 2021-06-10 PROCEDURE — 1090F PRES/ABSN URINE INCON ASSESS: CPT | Performed by: ORTHOPAEDIC SURGERY

## 2021-06-10 PROCEDURE — G8419 CALC BMI OUT NRM PARAM NOF/U: HCPCS | Performed by: ORTHOPAEDIC SURGERY

## 2021-06-10 NOTE — PROGRESS NOTES
Patient: Shanta Patel                MRN: [id number]       SSN: [id number]  YOB: 1940        AGE: 78 y.o. SEX: female  Body mass index is 25.58 kg/m².     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 at one point apparently cannot take blood thinners and is working to have her see neurology and also vascular surgery she might be a candidate for a filter I am wondering apparently she was told that she cannot take aspirin as well not usually or go to medication for DVT prophylaxis    She is also interested in the spinal and I think is very reasonable as well we can request one of the her preferred anesthesia providers to do this is very skilled    The pain is moderate usually not radicular and aching failed viscosupplementation and cortisone less than a 5-minute level walking tolerance difficulty with stairs at night pain is a feature as well    And the examination of today she does not appear to be 80 her body mass index is 26 the hips rotate well both feet warm well perfused she is in varus on the left side mild joint line tenderness in all 3 compartments slight effusion no foot drop and neurologically intact distally    X-rays from previous confirm end-stage arthritis likely with some osteoporosis    I recommend surgery cautiously with her assuming that we have a good answer for DVT prophylaxis and possible filter I would like her to see neurology vascular surgery and also get a bone density test for her as well and that we can chat a little bit more when they return    Risks and benefits described including but not limited to infection DVT pulmonary embolism anesthetic complications blood loss requiring transfusion chronic pain instability implant longevity arthrofibrosis and also the need for bending and straightening knee on an hourly basis to avoid complications    REVIEW OF SYSTEMS:      CON: negative  EYE: negative   ENT: negative  RESP: negative  GI:    negative   :  negative  MSK: Positive  A twelve point review of systems was completed, positives noted and all other systems were reviewed and are negative          Past Medical History:   Diagnosis Date    Female cystocele     asymptomatic    Hypothyroidism 11/09/2015    Mixed hyperlipidemia 01/14/2016    Subdural hematoma, post-traumatic ([facility] [address] 1985    Urinary incontinence        Family History   Problem Relation Age of Onset    Coronary Artery Disease Mother     Cancer Father         malignant neoplastic disease(brain)    Coronary Artery Disease Sister     Other Brother         neurological finding       Current Outpatient Medications   Medication Sig Dispense Refill    prednisoLONE acetate (PRED FORTE) 1 % ophthalmic suspension INSTILL 2 DROPS INTO EACH EAR ONCE A WEEK      mirabegron ER (Myrbetriq) 25 mg ER tablet Take 1 Tab by mouth daily. 90 Tab 1    multivitamin (ONE A DAY) tablet Take 1 Tab by mouth daily.  cyanocobalamin (VITAMIN B12) 1,000 mcg/mL injection 1,000 mcg by IntraMUSCular route once.  pyridoxine, vitamin B6, (VITAMIN B-6) 100 mg tablet Take 100 mg by mouth daily.  magnesium 250 mg tab Take  by mouth.  OTHER gastrocom      OTHER absorbaide      OTHER thyrosol      Biotin 2,500 mcg cap Take  by mouth.  OTHER Eye support      OTHER prevagen      cholecalciferol, vitamin D3, (VITAMIN D3) 2,000 unit tab Take  by mouth.  SYNTHROID 50 mcg tablet Take 50 mcg by mouth Daily (before breakfast).          Allergies   Allergen Reactions    Latex Rash    Iodinated Contrast Media Hives    Penicillins Hives    Aspirin Unknown (comments)    Black Pepper Rash     Other reaction(s): Abdominal Pain    Iodine Unknown (comments)    Lumigan [Bimatoprost] Other (comments)     Eye irritation and redness    Mint Unknown (comments) MOUTH BURNING    Nitrofurantoin Hives    Tamsulosin Unknown (comments)    Wheat Itching    Yeast Swelling       Past Surgical History:   Procedure Laterality Date    HX CARPAL TUNNEL RELEASE      HX CATARACT REMOVAL Bilateral 11/2011; 10/2011    both    HX CRANIOTOMY  1985    HX HYSTERECTOMY      HX OTHER SURGICAL  04/2010    R finger , 3rd finger Trigger release    HX OTHER SURGICAL  11/2009    Right hand excision Lipoma Dr. Gisell Tao History     Socioeconomic History    Marital status:      Spouse name: Not on file    Number of children: Not on file    Years of education: Not on file    Highest education level: Not on file   Occupational History    Not on file   Tobacco Use    Smoking status: Former Smoker    Smokeless tobacco: Never Used   Vaping Use    Vaping Use: Never used   Substance and Sexual Activity    Alcohol use: Not Currently    Drug use: Never    Sexual activity: Not on file   Other Topics Concern    Not on file   Social History Narrative    Not on file     Social Determinants of Health     Financial Resource Strain:     Difficulty of Paying Living Expenses:    Food Insecurity:     Worried About [employer] in the Last Year:     [address] [employer] in the Last Year:    Transportation Needs:     Lack of Transportation (Medical):      Lack of Transportation (Non-Medical):    Physical Activity:     Days of Exercise per Week:     Minutes of Exercise per Session:    Stress:     Feeling of Stress :    Social Connections:     Frequency of Communication with Friends and Family:     Frequency of Social Gatherings with Friends and Family:     Attends Sikh Services:     Active Member of Clubs or Organizations:     Attends Club or Organization Meetings:     Marital Status:    Intimate Partner Violence:     Fear of Current or Ex-Partner:     Emotionally Abused:     Physically Abused:     Sexually Abused:        Visit Vitals  Pulse 74   Temp 97.7 °F (36.5 °C) (Skin)   Ht 5' (1.524 m)   Wt 131 lb (59.4 kg)   SpO2 98%   BMI 25.58 kg/m²         PHYSICAL EXAMINATION:  GENERAL: Alert and oriented x3, in no acute distress, well-developed, well-nourished, afebrile. HEART: No JVD. EYES: No scleral icterus   NECK: No significant lymphadenopathy   LUNGS: No respiratory compromise or indrawing  ABDOMEN: Soft, non-tender, non-distended. Electronically signed by:  Artie Salas MD

## 2021-06-24 ENCOUNTER — HOSPITAL ENCOUNTER (OUTPATIENT)
Dept: BONE DENSITY | Age: 81
Discharge: HOME | End: 2021-06-24
Attending: ORTHOPAEDIC SURGERY
Payer: MEDICARE

## 2021-06-24 DIAGNOSIS — M81.0 AGE-RELATED OSTEOPOROSIS WITHOUT CURRENT PATHOLOGICAL FRACTURE: ICD-10-CM

## 2021-06-24 PROCEDURE — 77080 DXA BONE DENSITY AXIAL: CPT

## 2021-08-06 ENCOUNTER — OFFICE VISIT (OUTPATIENT)
Dept: ORTHOPEDIC SURGERY | Age: 81
End: 2021-08-06
Payer: MEDICARE

## 2021-08-06 VITALS — WEIGHT: 129 LBS | HEIGHT: 60 IN | BODY MASS INDEX: 25.32 KG/M2

## 2021-08-06 DIAGNOSIS — M17.12 OSTEOARTHRITIS OF LEFT KNEE, UNSPECIFIED OSTEOARTHRITIS TYPE: ICD-10-CM

## 2021-08-06 DIAGNOSIS — Z01.818 PREOP TESTING: ICD-10-CM

## 2021-08-06 DIAGNOSIS — Z01.818 PREOP TESTING: Primary | ICD-10-CM

## 2021-08-06 PROBLEM — L80 VITILIGO: Status: ACTIVE | Noted: 2020-05-06

## 2021-08-06 PROBLEM — R94.6 ABNORMAL THYROID EXAM: Status: ACTIVE | Noted: 2020-05-06

## 2021-08-06 PROBLEM — H26.9 CATARACT: Status: ACTIVE | Noted: 2021-08-06

## 2021-08-06 PROBLEM — H40.9 GLAUCOMA: Status: ACTIVE | Noted: 2021-08-06

## 2021-08-06 PROBLEM — M94.9 DISORDER OF BONE AND ARTICULAR CARTILAGE: Status: ACTIVE | Noted: 2021-08-06

## 2021-08-06 PROBLEM — J01.90 ACUTE SINUSITIS: Status: ACTIVE | Noted: 2021-08-06

## 2021-08-06 PROBLEM — M19.90 ARTHRITIS: Status: ACTIVE | Noted: 2020-05-06

## 2021-08-06 PROBLEM — M89.9 DISORDER OF BONE AND ARTICULAR CARTILAGE: Status: ACTIVE | Noted: 2021-08-06

## 2021-08-06 PROBLEM — R53.81 MALAISE AND FATIGUE: Status: ACTIVE | Noted: 2021-08-06

## 2021-08-06 PROBLEM — R53.83 MALAISE AND FATIGUE: Status: ACTIVE | Noted: 2021-08-06

## 2021-08-06 PROCEDURE — 1090F PRES/ABSN URINE INCON ASSESS: CPT | Performed by: ORTHOPAEDIC SURGERY

## 2021-08-06 PROCEDURE — 1101F PT FALLS ASSESS-DOCD LE1/YR: CPT | Performed by: ORTHOPAEDIC SURGERY

## 2021-08-06 PROCEDURE — G8419 CALC BMI OUT NRM PARAM NOF/U: HCPCS | Performed by: ORTHOPAEDIC SURGERY

## 2021-08-06 PROCEDURE — G8510 SCR DEP NEG, NO PLAN REQD: HCPCS | Performed by: ORTHOPAEDIC SURGERY

## 2021-08-06 PROCEDURE — 99203 OFFICE O/P NEW LOW 30 MIN: CPT | Performed by: ORTHOPAEDIC SURGERY

## 2021-08-06 PROCEDURE — G8536 NO DOC ELDER MAL SCRN: HCPCS | Performed by: ORTHOPAEDIC SURGERY

## 2021-08-06 PROCEDURE — G8427 DOCREV CUR MEDS BY ELIG CLIN: HCPCS | Performed by: ORTHOPAEDIC SURGERY

## 2021-08-06 RX ORDER — BIMATOPROST 0.1 MG/ML
SOLUTION/ DROPS OPHTHALMIC
COMMUNITY
Start: 2021-05-13

## 2021-08-06 RX ORDER — LEVOTHYROXINE SODIUM 50 UG/1
50 TABLET ORAL DAILY
COMMUNITY
Start: 2021-07-13

## 2021-08-06 NOTE — PATIENT INSTRUCTIONS
Knee Pain or Injury: Care Instructions  Your Care Instructions     Injuries are a common cause of knee problems. Sudden (acute) injuries may be caused by a direct blow to the knee. They can also be caused by abnormal twisting, bending, or falling on the knee. Pain, bruising, or swelling may be severe, and may start within minutes of the injury. Overuse is another cause of knee pain. Other causes are climbing stairs, kneeling, and other activities that use the knee. Everyday wear and tear, especially as you get older, also can cause knee pain. Rest, along with home treatment, often relieves pain and allows your knee to heal. If you have a serious knee injury, you may need tests and treatment. Follow-up care is a key part of your treatment and safety. Be sure to make and go to all appointments, and call your doctor if you are having problems. It's also a good idea to know your test results and keep a list of the medicines you take. How can you care for yourself at home? · Be safe with medicines. Read and follow all instructions on the label. ? If the doctor gave you a prescription medicine for pain, take it as prescribed. ? If you are not taking a prescription pain medicine, ask your doctor if you can take an over-the-counter medicine. · Rest and protect your knee. Take a break from any activity that may cause pain. · Put ice or a cold pack on your knee for 10 to 20 minutes at a time. Put a thin cloth between the ice and your skin. · Prop up a sore knee on a pillow when you ice it or anytime you sit or lie down for the next 3 days. Try to keep it above the level of your heart. This will help reduce swelling. · If your knee is not swollen, you can put moist heat, a heating pad, or a warm cloth on your knee. · If your doctor recommends an elastic bandage, sleeve, or other type of support for your knee, wear it as directed.   · Follow your doctor's instructions about how much weight you can put on your leg. Use a cane, crutches, or a walker as instructed. · Follow your doctor's instructions about activity during your healing process. If you can do mild exercise, slowly increase your activity. · Reach and stay at a healthy weight. Extra weight can strain the joints, especially the knees and hips, and make the pain worse. Losing even a few pounds may help. When should you call for help? Call 911 anytime you think you may need emergency care. For example, call if:    · You have symptoms of a blood clot in your lung (called a pulmonary embolism). These may include:  ? Sudden chest pain. ? Trouble breathing. ? Coughing up blood. Call your doctor now or seek immediate medical care if:    · You have severe or increasing pain.     · Your leg or foot turns cold or changes color.     · You cannot stand or put weight on your knee.     · Your knee looks twisted or bent out of shape.     · You cannot move your knee.     · You have signs of infection, such as:  ? Increased pain, swelling, warmth, or redness. ? Red streaks leading from the knee. ? Pus draining from a place on your knee. ? A fever.     · You have signs of a blood clot in your leg (called a deep vein thrombosis), such as:  ? Pain in your calf, back of the knee, thigh, or groin. ? Redness and swelling in your leg or groin. Watch closely for changes in your health, and be sure to contact your doctor if:    · You have tingling, weakness, or numbness in your knee.     · You have any new symptoms, such as swelling.     · You have bruises from a knee injury that last longer than 2 weeks.     · You do not get better as expected. Where can you learn more? Go to http://www.gray.com/  Enter K195 in the search box to learn more about \"Knee Pain or Injury: Care Instructions. \"  Current as of: February 26, 2020               Content Version: 12.8  © 4869-2443 "[employer].    Care instructions adapted under license by [employer] (which disclaims liability or warranty for this information). If you have questions about a medical condition or this instruction, always ask your healthcare professional. [address] any warranty or liability for your use of this information.

## 2021-08-06 NOTE — PROGRESS NOTES
Name: Juvenal Copeland    : 1940     Service Dept: [facility]    Patient's Pharmacies:    [facility] [address], [address] [facility]  [place] [address]  Phone: [phone] Fax: [phone]    [facility] [phone] - [address]  [address]  [address] [place] [address]  Phone: [phone] Fax: [phone]       Chief Complaint   Patient presents with    Knee Pain        Visit Vitals  Ht 5' (1.524 m)   Wt 129 lb (58.5 kg)   BMI 25.19 kg/m²      Allergies   Allergen Reactions    Latex Rash    Iodinated Contrast Media Hives    Penicillins Hives    Aspirin Unknown (comments)    Black Pepper Rash     Other reaction(s): Abdominal Pain    Iodine Unknown (comments)    Lumigan [Bimatoprost] Other (comments)     Eye irritation and redness    Mint Unknown (comments)      MOUTH BURNING    Nitrofurantoin Hives    Tamsulosin Unknown (comments)    Wheat Itching    Yeast Swelling      Current Outpatient Medications   Medication Sig Dispense Refill    levothyroxine (synthroid) 50 mcg tablet Take 50 mcg by mouth daily.  Lumigan 0.01 % ophthalmic drops INSTILL 1 DROP INTO BOTH EYES AT BEDTIME      prednisoLONE acetate (PRED FORTE) 1 % ophthalmic suspension INSTILL 2 DROPS INTO EACH EAR ONCE A WEEK      mirabegron ER (Myrbetriq) 25 mg ER tablet Take 1 Tab by mouth daily. 90 Tab 1    multivitamin (ONE A DAY) tablet Take 1 Tab by mouth daily.  cyanocobalamin (VITAMIN B12) 1,000 mcg/mL injection 1,000 mcg by IntraMUSCular route once.  pyridoxine, vitamin B6, (VITAMIN B-6) 100 mg tablet Take 100 mg by mouth daily.  magnesium 250 mg tab Take  by mouth.  OTHER gastrocom      OTHER absorbaide      OTHER thyrosol      Biotin 2,500 mcg cap Take  by mouth.  OTHER Eye support      OTHER prevagen      cholecalciferol, vitamin D3, (VITAMIN D3) 2,000 unit tab Take  by mouth.       SYNTHROID 50 mcg tablet Take 50 mcg by mouth Daily (before breakfast). Patient Active Problem List   Diagnosis Code    Pelvic organ prolapse quantification stage 3 cystocele N81.10    Acute sinusitis J01.90    Arthritis M19.90    Cataract H26.9    Disorder of bone and articular cartilage M89.9, M94.9    Elevated blood-pressure reading without diagnosis of hypertension R03.0    Glaucoma H40.9    Abnormal thyroid exam R94.6    IBS (irritable bowel syndrome) K58.9    Impacted cerumen H61.20    Knee pain M25.569    Malaise and fatigue R53.81, R53.83    Mixed hyperlipidemia E78.2    Osteoporosis M81.0    Overweight with body mass index (BMI) 25.0-29.9 E66.3    Pain in limb M79.609    Proteinuria R80.9    Sarcoidosis D86.9    Vitiligo L80    Well adult health check Z00.00      Family History   Problem Relation Age of Onset    Coronary Artery Disease Mother     Cancer Father         malignant neoplastic disease(brain)    Coronary Artery Disease Sister     Other Brother         neurological finding      Social History     Socioeconomic History    Marital status:      Spouse name: Not on file    Number of children: Not on file    Years of education: Not on file    Highest education level: Not on file   Tobacco Use    Smoking status: Former Smoker     Packs/day: 1.00     Years: 10.00     Pack years: 10.00     Quit date: 2000     Years since quittin.6    Smokeless tobacco: Never Used   Vaping Use    Vaping Use: Never used   Substance and Sexual Activity    Alcohol use: Not Currently    Drug use: Never     Social Determinants of Health     Financial Resource Strain:     Difficulty of Paying Living Expenses:    Food Insecurity:     Worried About Running Out of Food in the Last Year:     Ran Out of Food in the Last Year:    Transportation Needs:     Lack of Transportation (Medical):      Lack of Transportation (Non-Medical):    Physical Activity:     Days of Exercise per Week:     Minutes of Exercise per Session:    Stress:     Feeling of Stress :    Social Connections:     Frequency of Communication with Friends and Family:     Frequency of Social Gatherings with Friends and Family:     Attends Hinduism Services:     Active Member of Clubs or Organizations:     Attends Club or Organization Meetings:     Marital Status:       Past Surgical History:   Procedure Laterality Date    HX CARPAL TUNNEL RELEASE      HX CATARACT REMOVAL Bilateral 11/2011; 10/2011    both    HX CRANIOTOMY  1985    HX HYSTERECTOMY      HX OTHER SURGICAL  04/2010    R finger , 3rd finger Trigger release    HX OTHER SURGICAL  11/2009    Right hand excision Lipoma Dr. Ada Mcbride      Past Medical History:   Diagnosis Date    Arthritis     Female cystocele     asymptomatic    High cholesterol     Hypothyroidism 11/09/2015    Mixed hyperlipidemia 01/14/2016    Subdural hematoma, post-traumatic ([facility] [address] 1985    Urinary incontinence         I have reviewed and agree with [address] [place] [address] Nw and ROS and intake form in chart and the record furthermore I have reviewed prior medical record(s) regarding this patients care during this appointment. Review of Systems:   Patient is a pleasant appearing individual, appropriately dressed, well hydrated, well nourished, who is alert, appropriately oriented for age, and in no acute distress with a normal gait and normal affect who does not appear to be in any significant pain. Physical Exam:  Left Knee -Decrease range of motion with flexion, Knee arc of greater than 50 degrees, Some crepitation, Grossly neurovascularly intact, Good cap refill, No skin lesion, Moderate swelling, No gross instability, Some quadriceps weakness, Kellgren and Otto at least grade 3    Right Knee - Full Range of Motion, No crepitation, Grossly neurovascularly intact, Good cap refill, No skin lesion, No swelling, No gross instability, No quadriceps weakness   Encounter Diagnoses     ICD-10-CM ICD-9-CM   1.  Preop testing  Z01.818 V72.84   2. Osteoarthritis of left knee, unspecified osteoarthritis type  M17.12 715.96       HPI:  The patient is here with a chief complaint of left knee pain, sharp, throbbing pain. Pain is 10/10. ROS:  10-point review of systems is positive for instability and locking. X-rays are positive for severe OA. Assessment/Plan:  Plan will be for left total knee replacement, general medical clearance, cardiac clearance and Dr. Sg Garcia for vascular clearance, outpatient surgery, and we will go from there. As part of continued conservative pain management options the patient was advised to utilize Tylenol or OTC NSAIDS as long as it is not medically contraindicated. Return to Office: Follow-up and Dispositions    · Return for [place] [address]. Scribed by Griselda Hau as dictated by [facility] ALEXA Ernandez MD.  Documentation True and Accepted [facility] ALEXA Ernandez MD

## 2021-10-18 DIAGNOSIS — M17.12 OSTEOARTHRITIS OF LEFT KNEE, UNSPECIFIED OSTEOARTHRITIS TYPE: ICD-10-CM

## 2021-10-18 DIAGNOSIS — Z01.818 PREOP TESTING: ICD-10-CM

## 2021-10-26 ENCOUNTER — OFFICE VISIT (OUTPATIENT)
Dept: CARDIOLOGY CLINIC | Age: 81
End: 2021-10-26
Payer: MEDICARE

## 2021-10-26 VITALS — OXYGEN SATURATION: 97 % | HEIGHT: 60 IN | WEIGHT: 134 LBS | BODY MASS INDEX: 26.31 KG/M2

## 2021-10-26 DIAGNOSIS — Z01.818 PRE-OP EXAM: Primary | ICD-10-CM

## 2021-10-26 DIAGNOSIS — M94.9 DISORDER OF BONE AND ARTICULAR CARTILAGE: ICD-10-CM

## 2021-10-26 DIAGNOSIS — M19.90 ARTHRITIS: ICD-10-CM

## 2021-10-26 DIAGNOSIS — S06.5XAA SUBDURAL HEMATOMA: ICD-10-CM

## 2021-10-26 DIAGNOSIS — M89.9 DISORDER OF BONE AND ARTICULAR CARTILAGE: ICD-10-CM

## 2021-10-26 DIAGNOSIS — R03.0 ELEVATED BLOOD-PRESSURE READING WITHOUT DIAGNOSIS OF HYPERTENSION: ICD-10-CM

## 2021-10-26 DIAGNOSIS — Z01.818 PRE-OPERATIVE CLEARANCE: ICD-10-CM

## 2021-10-26 PROCEDURE — 1090F PRES/ABSN URINE INCON ASSESS: CPT | Performed by: INTERNAL MEDICINE

## 2021-10-26 PROCEDURE — 93000 ELECTROCARDIOGRAM COMPLETE: CPT | Performed by: INTERNAL MEDICINE

## 2021-10-26 PROCEDURE — G8427 DOCREV CUR MEDS BY ELIG CLIN: HCPCS | Performed by: INTERNAL MEDICINE

## 2021-10-26 PROCEDURE — G8536 NO DOC ELDER MAL SCRN: HCPCS | Performed by: INTERNAL MEDICINE

## 2021-10-26 PROCEDURE — 99204 OFFICE O/P NEW MOD 45 MIN: CPT | Performed by: INTERNAL MEDICINE

## 2021-10-26 PROCEDURE — G8419 CALC BMI OUT NRM PARAM NOF/U: HCPCS | Performed by: INTERNAL MEDICINE

## 2021-10-26 PROCEDURE — G8510 SCR DEP NEG, NO PLAN REQD: HCPCS | Performed by: INTERNAL MEDICINE

## 2021-10-26 PROCEDURE — 1101F PT FALLS ASSESS-DOCD LE1/YR: CPT | Performed by: INTERNAL MEDICINE

## 2021-10-26 NOTE — PROGRESS NOTES
Whitney Martinez presents today for   Chief Complaint   Patient presents with    New Patient     referred by [facility]     having left TKR on 12/9/21 at [address] [facility] with Dr. Gwen Arevalo preferred language for health care discussion is english/other. Is someone accompanying this pt? no    Is the patient using any DME equipment during [address]? no    Depression Screening:  3 most recent PHQ Screens 10/26/2021   Little interest or pleasure in doing things Not at all   Feeling down, depressed, irritable, or hopeless Not at all   Total Score PHQ 2 0       Learning Assessment:  Learning Assessment 8/6/2021   PRIMARY LEARNER Patient   HIGHEST LEVEL OF EDUCATION - PRIMARY LEARNER  GRADUATED HIGH SCHOOL OR GED   BARRIERS PRIMARY LEARNER NONE   PRIMARY LANGUAGE ENGLISH   LEARNER PREFERENCE PRIMARY LISTENING   LEARNING SPECIAL TOPICS NO   ANSWERED BY SELF   RELATIONSHIP SELF     Fall Risk  Fall Risk Assessment, last 12 mths 10/26/2021   Able to walk? Yes   Fall in past 12 months? 0   Do you feel unsteady? 0   Are you worried about falling 0   Is TUG test greater than 12 seconds? -   Is the gait abnormal? -   Number of falls in past 12 months -   Fall with injury? -       Pt currently taking Anticoagulant therapy? no    Coordination of Care:  1. Have you been to the ER, urgent care clinic since your last visit? Hospitalized since your last visit? no    2. Have you seen or consulted any other health care providers outside of the [address] [place] [address] since your last visit? Include any pap smears or colon screening.  no

## 2021-11-01 PROBLEM — E78.5 DYSLIPIDEMIA: Status: ACTIVE | Noted: 2021-11-01

## 2021-11-01 PROBLEM — Z01.818 PRE-OPERATIVE CLEARANCE: Status: ACTIVE | Noted: 2021-11-01

## 2021-11-01 PROBLEM — S06.5XAA SUBDURAL HEMATOMA: Status: ACTIVE | Noted: 2021-11-01

## 2021-11-01 NOTE — PROGRESS NOTES
Subjective:      Garima Joel is in the office today for cardiac evaluation. She is scheduled for a total knee replacement sometime in early part of December. She has no known prior cardiac illness. She has had no chest pain or shortness of breath. She has had no peripheral swelling. She has had no PND or orthopnea. She has a rare palpitation. She never had near syncope or syncope. She is primary limited by her knee pain. She does walk through her house frequently without any limiting shortness of breath. Patient's cardiac risk factors are ; elevated BP at times without the diagnosis of hypertension, mild hypercholesterolemia       Patient Active Problem List    Diagnosis Date Noted    Pre-operative clearance 11/01/2021    Dyslipidemia 11/01/2021    Subdural hematoma (HCC) 11/01/2021    Acute sinusitis 08/06/2021    Cataract 08/06/2021    Disorder of bone and articular cartilage 08/06/2021    Glaucoma 08/06/2021    Malaise and fatigue 08/06/2021    Arthritis 05/06/2020    Abnormal thyroid exam 05/06/2020    Vitiligo 05/06/2020    Pelvic organ prolapse quantification stage 3 cystocele 11/26/2019    Elevated blood-pressure reading without diagnosis of hypertension 01/14/2016    Mixed hyperlipidemia 01/14/2016    Osteoporosis 01/14/2016    Overweight with body mass index (BMI) 25.0-29.9 01/14/2016    Proteinuria 01/14/2016    Impacted cerumen 02/13/2015    Pain in limb 02/13/2015    Knee pain 01/30/2015    Sarcoidosis 12/01/2014    IBS (irritable bowel syndrome) 01/01/2009     Current Outpatient Medications   Medication Sig Dispense Refill    Lumigan 0.01 % ophthalmic drops INSTILL 1 DROP INTO BOTH EYES AT BEDTIME      levothyroxine (synthroid) 50 mcg tablet Take 50 mcg by mouth daily.  prednisoLONE acetate (PRED FORTE) 1 % ophthalmic suspension INSTILL 2 DROPS INTO EACH EAR ONCE A WEEK      mirabegron ER (Myrbetriq) 25 mg ER tablet Take 1 Tab by mouth daily.  90 Tab 1    multivitamin (ONE A DAY) tablet Take 1 Tab by mouth daily.  cyanocobalamin (VITAMIN B12) 1,000 mcg/mL injection 1,000 mcg by IntraMUSCular route once.  pyridoxine, vitamin B6, (VITAMIN B-6) 100 mg tablet Take 100 mg by mouth daily.  magnesium 250 mg tab Take  by mouth.  OTHER gastrocom      OTHER absorbaide      OTHER thyrosol      Biotin 2,500 mcg cap Take  by mouth.  OTHER Eye support      OTHER prevagen      cholecalciferol, vitamin D3, (VITAMIN D3) 2,000 unit tab Take  by mouth.  SYNTHROID 50 mcg tablet Take 50 mcg by mouth Daily (before breakfast).        Allergies   Allergen Reactions    Latex Rash    Iodinated Contrast Media Hives    Penicillins Hives    Aspirin Unknown (comments)    Black Pepper Rash     Other reaction(s): Abdominal Pain    Iodine Unknown (comments)    Lumigan [Bimatoprost] Other (comments)     Eye irritation and redness    Mint Unknown (comments)      MOUTH BURNING    Nitrofurantoin Hives    Tamsulosin Unknown (comments)    Wheat Itching    Yeast Swelling     Past Medical History:   Diagnosis Date    Arthritis     Female cystocele     asymptomatic    High cholesterol     Hypothyroidism 11/09/2015    Mixed hyperlipidemia 01/14/2016    Subdural hematoma, post-traumatic ([facility] [address] 1985    Urinary incontinence      Past Surgical History:   Procedure Laterality Date    HX CARPAL TUNNEL RELEASE      HX CATARACT REMOVAL Bilateral 11/2011; 10/2011    both    HX CRANIOTOMY  1985    HX HYSTERECTOMY      HX OTHER SURGICAL  04/2010    R finger , 3rd finger Trigger release    HX OTHER SURGICAL  11/2009    Right hand excision Lipoma Dr. Benson Wade     Family History   Problem Relation Age of Onset    Coronary Artery Disease Mother     Cancer Father         malignant neoplastic disease(brain)    Coronary Artery Disease Sister     Other Brother         neurological finding     Social History     Tobacco Use   Smoking Status Former Smoker    Packs/day: .00    Years: 10.00    Pack years: 10.00    Quit date: 2000    Years since quittin.8   Smokeless Tobacco Never Used          Review of Systems, additional:  Constitutional: negative  Eyes: negative  Respiratory: negative  Cardiovascular: negative  Gastrointestinal: negative  Musculoskeletal:negative  Neurological: negative  Behvioral/Psych: negative  Endocrine: negative  ENT: negative    Objective:     Visit Vitals  Ht 5' (1.524 m)   Wt 60.8 kg (134 lb)   SpO2 97%   BMI 26.17 kg/m²     General:  alert, cooperative, no distress   Chest Wall: inspection normal - no chest wall deformities or tenderness, respiratory effort normal   Lung: clear to auscultation bilaterally   Heart:  normal rate and regular rhythm, S1 and S2 normal, no murmurs noted, no gallops noted, no JVD   Abdomen: soft, non-tender. Bowel sounds normal. No masses,  no organomegaly   Extremities: extremities normal, atraumatic, no cyanosis or edema Skin: no rashes   Neuro: alert, oriented, normal speech, no focal findings or movement disorder noted     EK2021. Sinus rhythm. Normal.    Assessment/Plan:       ICD-10-CM ICD-9-CM    1. Pre-op exam  Z01.818 V72.84 AMB POC EKG ROUTINE W/ 12 LEADS, INTER & REP   2. Elevated blood-pressure reading without diagnosis of hypertension  R03.0 796.2    3. Pre-operative clearance, left total knee replacement. She has had no symptoms suggestive of ischemic cardiac disease, and no symptoms of decompensated heart failure or unstable cardiac rhythm. She has a rare palpitation. The patient has a fair functional capacity. She walks through a large house most of the day without any limiting shortness of breath. Her ECG is normal.  I believe the patient is an acceptable risk for upcoming knee replacement. Z01.818 V72.84    4. Subdural hematoma (HCC)  S06.5X9A 432.1    5. Disorder of bone and articular cartilage  M89.9 733.90     M94.9     6.  Arthritis  M19.90 716.90

## 2021-11-17 ENCOUNTER — ANESTHESIA EVENT (OUTPATIENT)
Dept: SURGERY | Age: 81
End: 2021-11-17
Payer: MEDICARE

## 2021-12-01 DIAGNOSIS — M17.12 OSTEOARTHRITIS OF LEFT KNEE, UNSPECIFIED OSTEOARTHRITIS TYPE: Primary | ICD-10-CM

## 2021-12-01 DIAGNOSIS — M25.562 LEFT KNEE PAIN, UNSPECIFIED CHRONICITY: ICD-10-CM

## 2021-12-01 PROBLEM — Z01.818 PRE-OPERATIVE CLEARANCE: Status: RESOLVED | Noted: 2021-11-01 | Resolved: 2021-12-01

## 2021-12-02 ENCOUNTER — HOSPITAL ENCOUNTER (OUTPATIENT)
Dept: PREADMISSION TESTING | Age: 81
Discharge: HOME | End: 2021-12-02
Payer: MEDICARE

## 2021-12-02 ENCOUNTER — OFFICE VISIT (OUTPATIENT)
Dept: ORTHOPEDIC SURGERY | Age: 81
End: 2021-12-02
Payer: MEDICARE

## 2021-12-02 DIAGNOSIS — M25.562 LEFT KNEE PAIN, UNSPECIFIED CHRONICITY: ICD-10-CM

## 2021-12-02 DIAGNOSIS — M17.12 OSTEOARTHRITIS OF LEFT KNEE, UNSPECIFIED OSTEOARTHRITIS TYPE: Primary | ICD-10-CM

## 2021-12-02 LAB — SARS-COV-2, COV2: NORMAL

## 2021-12-02 PROCEDURE — U0003 INFECTIOUS AGENT DETECTION BY NUCLEIC ACID (DNA OR RNA); SEVERE ACUTE RESPIRATORY SYNDROME CORONAVIRUS 2 (SARS-COV-2) (CORONAVIRUS DISEASE [COVID-19]), AMPLIFIED PROBE TECHNIQUE, MAKING USE OF HIGH THROUGHPUT TECHNOLOGIES AS DESCRIBED BY CMS-2020-01-R: HCPCS

## 2021-12-02 PROCEDURE — G8432 DEP SCR NOT DOC, RNG: HCPCS | Performed by: ORTHOPAEDIC SURGERY

## 2021-12-02 PROCEDURE — G8419 CALC BMI OUT NRM PARAM NOF/U: HCPCS | Performed by: ORTHOPAEDIC SURGERY

## 2021-12-02 PROCEDURE — 1090F PRES/ABSN URINE INCON ASSESS: CPT | Performed by: ORTHOPAEDIC SURGERY

## 2021-12-02 PROCEDURE — G8427 DOCREV CUR MEDS BY ELIG CLIN: HCPCS | Performed by: ORTHOPAEDIC SURGERY

## 2021-12-02 PROCEDURE — 99214 OFFICE O/P EST MOD 30 MIN: CPT | Performed by: ORTHOPAEDIC SURGERY

## 2021-12-02 PROCEDURE — 1101F PT FALLS ASSESS-DOCD LE1/YR: CPT | Performed by: ORTHOPAEDIC SURGERY

## 2021-12-02 PROCEDURE — G8536 NO DOC ELDER MAL SCRN: HCPCS | Performed by: ORTHOPAEDIC SURGERY

## 2021-12-02 RX ORDER — OXYCODONE AND ACETAMINOPHEN 5; 325 MG/1; MG/1
1 TABLET ORAL
Qty: 30 TABLET | Refills: 0 | Status: SHIPPED | OUTPATIENT
Start: 2021-12-02 | End: 2021-12-10

## 2021-12-02 RX ORDER — CLINDAMYCIN PHOSPHATE 900 MG/50ML
900 INJECTION INTRAVENOUS ONCE
Status: CANCELLED | OUTPATIENT
Start: 2021-12-02 | End: 2021-12-02

## 2021-12-02 RX ORDER — ONDANSETRON 4 MG/1
4 TABLET, ORALLY DISINTEGRATING ORAL
Qty: 20 TABLET | Refills: 0 | Status: SHIPPED | OUTPATIENT
Start: 2021-12-02 | End: 2021-12-16

## 2021-12-02 RX ORDER — CLINDAMYCIN HYDROCHLORIDE 300 MG/1
300 CAPSULE ORAL EVERY 8 HOURS
Qty: 9 CAPSULE | Refills: 0 | Status: SHIPPED | OUTPATIENT
Start: 2021-12-02 | End: 2021-12-05

## 2021-12-02 NOTE — PROGRESS NOTES
Name: Edison Hansen    : 1940     Service Dept: [address] [place] [address] and Sports Medicine    Chief Complaint   Patient presents with    Pre-op Exam    Knee Pain        There were no vitals taken for this visit. Allergies   Allergen Reactions    Latex Rash    Iodinated Contrast Media Hives    Penicillins Hives    Aspirin Unknown (comments)    Black Pepper Rash     Other reaction(s): Abdominal Pain    Iodine Unknown (comments)    Lumigan [Bimatoprost] Other (comments)     Eye irritation and redness    Mint Unknown (comments)      MOUTH BURNING    Nitrofurantoin Hives    Tamsulosin Unknown (comments)    Wheat Itching    Yeast Swelling        Current Outpatient Medications   Medication Sig Dispense Refill    Lumigan 0.01 % ophthalmic drops INSTILL 1 DROP INTO BOTH EYES AT BEDTIME      levothyroxine (synthroid) 50 mcg tablet Take 50 mcg by mouth daily.  prednisoLONE acetate (PRED FORTE) 1 % ophthalmic suspension INSTILL 2 DROPS INTO EACH EAR ONCE A WEEK      mirabegron ER (Myrbetriq) 25 mg ER tablet Take 1 Tab by mouth daily. 90 Tab 1    multivitamin (ONE A DAY) tablet Take 1 Tab by mouth daily.  cyanocobalamin (VITAMIN B12) 1,000 mcg/mL injection 1,000 mcg by IntraMUSCular route once.  pyridoxine, vitamin B6, (VITAMIN B-6) 100 mg tablet Take 100 mg by mouth daily.  magnesium 250 mg tab Take  by mouth.  Biotin 2,500 mcg cap Take  by mouth.  cholecalciferol, vitamin D3, (VITAMIN D3) 2,000 unit tab Take  by mouth.  SYNTHROID 50 mcg tablet Take 50 mcg by mouth Daily (before breakfast).         Patient Active Problem List   Diagnosis Code    Pelvic organ prolapse quantification stage 3 cystocele N81.10    Acute sinusitis J01.90    Arthritis M19.90    Cataract H26.9    Disorder of bone and articular cartilage M89.9, M94.9    Elevated blood-pressure reading without diagnosis of hypertension R03.0    Glaucoma H40.9    Abnormal thyroid exam R94.6    IBS (irritable bowel syndrome) K58.9    Impacted cerumen H61.20    Knee pain M25.569    Malaise and fatigue R53.81, R53.83    Mixed hyperlipidemia E78.2    Osteoporosis M81.0    Overweight with body mass index (BMI) 25.0-29.9 E66.3    Pain in limb M79.609    Proteinuria R80.9    Sarcoidosis D86.9    Vitiligo L80    Dyslipidemia E78.5    Subdural hematoma ([address] S12.5X7A      Family History   Problem Relation Age of Onset    Coronary Art Dis Mother     Cancer Father         malignant neoplastic disease(brain)    Coronary Art Dis Sister     Other Brother         neurological finding      Social History     Socioeconomic History    Marital status:    Tobacco Use    Smoking status: Former Smoker     Packs/day: 1.00     Years: 10.00     Pack years: 10.00     Quit date: 2000     Years since quittin.9    Smokeless tobacco: Never Used   Vaping Use    Vaping Use: Never used   Substance and Sexual Activity    Alcohol use: Not Currently    Drug use: Never      Past Surgical History:   Procedure Laterality Date    HX CARPAL TUNNEL RELEASE      HX CATARACT REMOVAL Bilateral 2011; 10/2011    both    HX CRANIOTOMY  1985    HX HYSTERECTOMY      HX OTHER SURGICAL  2010    R finger , 3rd finger Trigger release    HX OTHER SURGICAL  2009    Right hand excision Lipoma Dr. Monica Hilario      Past Medical History:   Diagnosis Date    Arthritis     Female cystocele     asymptomatic    High cholesterol     Hypothyroidism 2015    Mixed hyperlipidemia 2016    Subdural hematoma, post-traumatic ([facility] [address] 1985    Urinary incontinence         I have reviewed and agree with [address] [place] [address] Nw and ROS and intake form in chart and the record furthermore I have reviewed prior medical record(s) regarding this patients care during this appointment.      Review of Systems:   Patient is a pleasant appearing individual, appropriately dressed, well hydrated, well nourished, who is alert, appropriately oriented for age, and in no acute distress with a normal gait and normal affect who does not appear to be in any significant pain. Physical Exam:  Left Knee -Decrease range of motion with flexion, Knee arc of greater than 50 degrees, Some crepitation, Grossly neurovascularly intact, Good cap refill, No skin lesion, Moderate swelling, No gross instability, Some quadriceps weakness, Kellgren and Otto at least grade 3    Right Knee - Full Range of Motion, No crepitation, Grossly neurovascularly intact, Good cap refill, No skin lesion, No swelling, No gross instability, No quadriceps weakness    Inpatient status: The patient has admitted to severe pain in the affected knee and due to such pain they are unable to complete activities of daily living at home and/or work on a regular basis where conservative treatments have failed. After extensive discussion with the patient, they have chosen to receive a total knee replacement with the expectation of inpatient procedure. Their dependent functional status (i.e. lack of capable support and safety at home, pain management, comorbities, or difficulty ambulating with assistive walking devices) would deem them a candidate for an inpatient stay. The patient acknowledges and understand the plan. The risks of surgery were explained to the patient which include but not limited to infection, nerve injury, artery injury, tendon injury, poor result, poor wound healing, unforeseen incidence, bleeding, infection, nerve damage, failure to improve, worsening of symptoms, morbidity, and mortality risks were explained. All questions were answered. Patient was told of no guarantees. Patient accepts all risks and benefits. A consent for surgery will be documented and signed by the patient or a legal guardian. All questions were answered. The procedure was explained in detail.      The patient was counseled about the risks of evans Covid-19 during their perioperative period and any recovery window from their procedure. The patient was made aware that evans Covid-19 may worsen their prognosis for recovering from their procedure and lend to a higher morbidity and/or mortality risk. All material risks, benefits, and reasonable alternatives including postponing the procedure were discussed. The patient DOES wish to proceed with their procedure at this time. Encounter Diagnoses     ICD-10-CM ICD-9-CM   1. Osteoarthritis of left knee, unspecified osteoarthritis type  M17.12 715.96   2. Left knee pain, unspecified chronicity  M25.562 719.46       HPI:  The patient is here with a chief complaint of left knee pain, dull, throbbing pain, progressively getting worse. Pain is 8/10. X-rays are positive for severe OA of the left knee. Continues to have difficulty with it. Failed conservative treatment. Assessment/Plan:  Plan will be for left total knee replacement, Percocet, clindamycin, Zofran and Eliquis. We may keep her overnight just because of her tender age of 78, and we will go from there. As part of continued conservative pain management options the patient was advised to utilize Tylenol or OTC NSAIDS as long as it is not medically contraindicated. Return to Office: Follow-up and Dispositions    · Return for already scheduled for surgery. Scribed by Amber Carey LPN as dictated by [facility] ALEXA Underwood MD.  Documentation True and Accepted Darien Underwood MD

## 2021-12-02 NOTE — H&P (VIEW-ONLY)
Name: Maile Cardona    : 1940     Service Dept: [address] [place] [address] Sports Medicine    Chief Complaint   Patient presents with    Pre-op Exam    Knee Pain        There were no vitals taken for this visit. Allergies   Allergen Reactions    Latex Rash    Iodinated Contrast Media Hives    Penicillins Hives    Aspirin Unknown (comments)    Black Pepper Rash     Other reaction(s): Abdominal Pain    Iodine Unknown (comments)    Lumigan [Bimatoprost] Other (comments)     Eye irritation and redness    Mint Unknown (comments)      MOUTH BURNING    Nitrofurantoin Hives    Tamsulosin Unknown (comments)    Wheat Itching    Yeast Swelling        Current Outpatient Medications   Medication Sig Dispense Refill    Lumigan 0.01 % ophthalmic drops INSTILL 1 DROP INTO BOTH EYES AT BEDTIME      levothyroxine (synthroid) 50 mcg tablet Take 50 mcg by mouth daily.  prednisoLONE acetate (PRED FORTE) 1 % ophthalmic suspension INSTILL 2 DROPS INTO EACH EAR ONCE A WEEK      mirabegron ER (Myrbetriq) 25 mg ER tablet Take 1 Tab by mouth daily. 90 Tab 1    multivitamin (ONE A DAY) tablet Take 1 Tab by mouth daily.  cyanocobalamin (VITAMIN B12) 1,000 mcg/mL injection 1,000 mcg by IntraMUSCular route once.  pyridoxine, vitamin B6, (VITAMIN B-6) 100 mg tablet Take 100 mg by mouth daily.  magnesium 250 mg tab Take  by mouth.  Biotin 2,500 mcg cap Take  by mouth.  cholecalciferol, vitamin D3, (VITAMIN D3) 2,000 unit tab Take  by mouth.  SYNTHROID 50 mcg tablet Take 50 mcg by mouth Daily (before breakfast).         Patient Active Problem List   Diagnosis Code    Pelvic organ prolapse quantification stage 3 cystocele N81.10    Acute sinusitis J01.90    Arthritis M19.90    Cataract H26.9    Disorder of bone and articular cartilage M89.9, M94.9    Elevated blood-pressure reading without diagnosis of hypertension R03.0    Glaucoma H40.9    Abnormal thyroid exam R94.6    IBS (irritable bowel syndrome) K58.9    Impacted cerumen H61.20    Knee pain M25.569    Malaise and fatigue R53.81, R53.83    Mixed hyperlipidemia E78.2    Osteoporosis M81.0    Overweight with body mass index (BMI) 25.0-29.9 E66.3    Pain in limb M79.609    Proteinuria R80.9    Sarcoidosis D86.9    Vitiligo L80    Dyslipidemia E78.5    Subdural hematoma ([address] S12.5X7A      Family History   Problem Relation Age of Onset    Coronary Art Dis Mother     Cancer Father         malignant neoplastic disease(brain)    Coronary Art Dis Sister     Other Brother         neurological finding      Social History     Socioeconomic History    Marital status:    Tobacco Use    Smoking status: Former Smoker     Packs/day: 1.00     Years: 10.00     Pack years: 10.00     Quit date: 2000     Years since quittin.9    Smokeless tobacco: Never Used   Vaping Use    Vaping Use: Never used   Substance and Sexual Activity    Alcohol use: Not Currently    Drug use: Never      Past Surgical History:   Procedure Laterality Date    HX CARPAL TUNNEL RELEASE      HX CATARACT REMOVAL Bilateral 2011; 10/2011    both    HX CRANIOTOMY  1985    HX HYSTERECTOMY      HX OTHER SURGICAL  2010    R finger , 3rd finger Trigger release    HX OTHER SURGICAL  2009    Right hand excision Lipoma Dr. Ingrid Bradford      Past Medical History:   Diagnosis Date    Arthritis     Female cystocele     asymptomatic    High cholesterol     Hypothyroidism 2015    Mixed hyperlipidemia 2016    Subdural hematoma, post-traumatic ([facility] [address] 1985    Urinary incontinence         I have reviewed and agree with [address] [place] [address] Nw and ROS and intake form in chart and the record furthermore I have reviewed prior medical record(s) regarding this patients care during this appointment.      Review of Systems:   Patient is a pleasant appearing individual, appropriately dressed, well hydrated, well nourished, who is alert, appropriately oriented for age, and in no acute distress with a normal gait and normal affect who does not appear to be in any significant pain. Physical Exam:  Left Knee -Decrease range of motion with flexion, Knee arc of greater than 50 degrees, Some crepitation, Grossly neurovascularly intact, Good cap refill, No skin lesion, Moderate swelling, No gross instability, Some quadriceps weakness, Kellgren and Otto at least grade 3    Right Knee - Full Range of Motion, No crepitation, Grossly neurovascularly intact, Good cap refill, No skin lesion, No swelling, No gross instability, No quadriceps weakness    Inpatient status: The patient has admitted to severe pain in the affected knee and due to such pain they are unable to complete activities of daily living at home and/or work on a regular basis where conservative treatments have failed. After extensive discussion with the patient, they have chosen to receive a total knee replacement with the expectation of inpatient procedure. Their dependent functional status (i.e. lack of capable support and safety at home, pain management, comorbities, or difficulty ambulating with assistive walking devices) would deem them a candidate for an inpatient stay. The patient acknowledges and understand the plan. The risks of surgery were explained to the patient which include but not limited to infection, nerve injury, artery injury, tendon injury, poor result, poor wound healing, unforeseen incidence, bleeding, infection, nerve damage, failure to improve, worsening of symptoms, morbidity, and mortality risks were explained. All questions were answered. Patient was told of no guarantees. Patient accepts all risks and benefits. A consent for surgery will be documented and signed by the patient or a legal guardian. All questions were answered. The procedure was explained in detail.      The patient was counseled about the risks of evans Covid-19 during their perioperative period and any recovery window from their procedure. The patient was made aware that evans Covid-19 may worsen their prognosis for recovering from their procedure and lend to a higher morbidity and/or mortality risk. All material risks, benefits, and reasonable alternatives including postponing the procedure were discussed. The patient DOES wish to proceed with their procedure at this time. Encounter Diagnoses     ICD-10-CM ICD-9-CM   1. Osteoarthritis of left knee, unspecified osteoarthritis type  M17.12 715.96   2. Left knee pain, unspecified chronicity  M25.562 719.46       HPI:  The patient is here with a chief complaint of left knee pain, dull, throbbing pain, progressively getting worse. Pain is 8/10. X-rays are positive for severe OA of the left knee. Continues to have difficulty with it. Failed conservative treatment. Assessment/Plan:  Plan will be for left total knee replacement, Percocet, clindamycin, Zofran and Eliquis. We may keep her overnight just because of her tender age of 78, and we will go from there. As part of continued conservative pain management options the patient was advised to utilize Tylenol or OTC NSAIDS as long as it is not medically contraindicated. Return to Office: Follow-up and Dispositions    · Return for already scheduled for surgery. Scribed by Zeny Viveros LPN as dictated by [facility] [place] ALEXA Andrade MD.  Documentation True and Accepted Darien Andrade MD

## 2021-12-02 NOTE — LETTER
12/3/2021    Patient: Ernesto Rae   YOB: 1940   Date of Visit: 12/2/2021     Sylvie Bloch, MD  [address]  Via Fax: [phone]    Dear Sylvie Bloch, MD,      Thank you for referring Ms. Ramiro Serrano to [facility] [address] AND [facility] for evaluation. My notes for this consultation are attached. If you have questions, please do not hesitate to call me. I look forward to following your patient along with you.       Sincerely,    Myranda Hernández MD

## 2021-12-02 NOTE — PATIENT INSTRUCTIONS
Knee Arthritis: Care Instructions  Your Care Instructions     Knee arthritis is a breakdown of the cartilage that cushions your knee joint. When the cartilage wears down, your bones rub against each other. This causes pain and stiffness. Knee arthritis tends to get worse with time. Treatment for knee arthritis involves reducing pain, making the leg muscles stronger, and staying at a healthy body weight. The treatment usually does not improve the health of the cartilage, but it can reduce pain and improve how well your knee works. You can take simple measures to protect your knee joints, ease your pain, and help you stay active. Follow-up care is a key part of your treatment and safety. Be sure to make and go to all appointments, and call your doctor if you are having problems. It's also a good idea to know your test results and keep a list of the medicines you take. How can you care for yourself at home? · Know that knee arthritis will cause more pain on some days than on others. · Stay at a healthy weight. Lose weight if you are overweight. When you stand up, the pressure on your knees from every pound of body weight is multiplied four times. So if you lose 10 pounds, you will reduce the pressure on your knees by 40 pounds. · Talk to your doctor or physical therapist about exercises that will help ease joint pain. ? Stretch to help prevent stiffness and to prevent injury before you exercise. You may enjoy gentle forms of yoga to help keep your knee joints and muscles flexible. ? Walk instead of jog.  ? Ride a bike. This makes your thigh muscles stronger and takes pressure off your knee. ? Wear well-fitting and comfortable shoes. ? Exercise in chest-deep water. This can help you exercise longer with less pain. ? Avoid exercises that include squatting or kneeling. They can put a lot of strain on your knees.   ? Talk to your doctor to make sure that the exercise you do is not making the arthritis worse.  · Do not sit for long periods of time. Try to walk once in a while to keep your knee from getting stiff. · Ask your doctor or physical therapist whether shoe inserts may reduce your arthritis pain. · If you can afford it, get new athletic shoes at least every year. This can help reduce the strain on your knees. · Use a device to help you do everyday activities. ? A cane or walking stick can help you keep your balance when you walk. Hold the cane or walking stick in the hand opposite the painful knee. ? If you feel like you may fall when you walk, try using crutches or a front-wheeled walker. These can prevent falls that could cause more damage to your knee. ? A knee brace may help keep your knee stable and prevent pain. ? You also can use other things to make life easier, such as a higher toilet seat and handrails in the bathtub or shower. · Take pain medicines exactly as directed. ? Do not wait until you are in severe pain. You will get better results if you take it sooner. ? If you are not taking a prescription pain medicine, take an over-the-counter medicine such as acetaminophen (Tylenol), ibuprofen (Advil, Motrin), or naproxen (Aleve). Read and follow all instructions on the label. ? Do not take two or more pain medicines at the same time unless the doctor told you to. Many pain medicines have acetaminophen, which is Tylenol. Too much acetaminophen (Tylenol) can be harmful. ? Tell your doctor if you take a blood thinner, have diabetes, or have allergies to shellfish. · Ask your doctor if you might benefit from a shot of steroid medicine into your knee. This may provide pain relief for several months. · Many people take the supplements glucosamine and chondroitin for osteoarthritis. Some people feel they help, but the medical research does not show that they work. Talk to your doctor before you take these supplements. When should you call for help?    Call your doctor now or seek immediate medical care if:    · You have sudden swelling, warmth, or pain in your knee.     · You have knee pain and a fever or rash.     · You have such bad pain that you cannot use your knee. Watch closely for changes in your health, and be sure to contact your doctor if you have any problems. Where can you learn more? Go to http://www.gray.com/  Enter W187 in the search box to learn more about \"Knee Arthritis: Care Instructions. \"  Current as of: April 30, 2021               Content Version: 13.0  © 5762-5380 [employer]. Care instructions adapted under license by [employer] (which disclaims liability or warranty for this information). If you have questions about a medical condition or this instruction, always ask your healthcare professional. [address] any warranty or liability for your use of this information.

## 2021-12-03 LAB — SARS-COV-2, NAA: NOT DETECTED

## 2021-12-09 ENCOUNTER — HOSPITAL ENCOUNTER (OUTPATIENT)
Age: 81
Setting detail: OBSERVATION
Discharge: HOME | End: 2021-12-10
Attending: ORTHOPAEDIC SURGERY | Admitting: INTERNAL MEDICINE
Payer: MEDICARE

## 2021-12-09 ENCOUNTER — APPOINTMENT (OUTPATIENT)
Dept: GENERAL RADIOLOGY | Age: 81
End: 2021-12-09
Attending: NURSE PRACTITIONER
Payer: MEDICARE

## 2021-12-09 ENCOUNTER — ANESTHESIA (OUTPATIENT)
Dept: SURGERY | Age: 81
End: 2021-12-09
Payer: MEDICARE

## 2021-12-09 DIAGNOSIS — M17.12 PRIMARY OSTEOARTHRITIS OF LEFT KNEE: Primary | ICD-10-CM

## 2021-12-09 PROBLEM — M17.9 KNEE OSTEOARTHRITIS: Status: ACTIVE | Noted: 2021-12-09

## 2021-12-09 LAB
ABO + RH BLD: NORMAL
BLOOD GROUP ANTIBODIES SERPL: NEGATIVE
SPECIMEN EXP DATE BLD: NORMAL

## 2021-12-09 PROCEDURE — 74011250636 HC RX REV CODE- 250/636: Performed by: NURSE ANESTHETIST, CERTIFIED REGISTERED

## 2021-12-09 PROCEDURE — 77030006835 HC BLD SAW SAG STRY -B: Performed by: ORTHOPAEDIC SURGERY

## 2021-12-09 PROCEDURE — C1713 ANCHOR/SCREW BN/BN,TIS/BN: HCPCS | Performed by: ORTHOPAEDIC SURGERY

## 2021-12-09 PROCEDURE — 77030039147 HC PWDR HEMSTS SURGICEL JNJ -D: Performed by: ORTHOPAEDIC SURGERY

## 2021-12-09 PROCEDURE — 2709999900 HC NON-CHARGEABLE SUPPLY: Performed by: ORTHOPAEDIC SURGERY

## 2021-12-09 PROCEDURE — 97530 THERAPEUTIC ACTIVITIES: CPT

## 2021-12-09 PROCEDURE — 77030031140 HC SUT VCRL3 J&J -A: Performed by: ORTHOPAEDIC SURGERY

## 2021-12-09 PROCEDURE — C1776 JOINT DEVICE (IMPLANTABLE): HCPCS | Performed by: ORTHOPAEDIC SURGERY

## 2021-12-09 PROCEDURE — 77030011266 HC ELECTRD BLD INSL COVD -A: Performed by: ORTHOPAEDIC SURGERY

## 2021-12-09 PROCEDURE — 86901 BLOOD TYPING SEROLOGIC RH(D): CPT

## 2021-12-09 PROCEDURE — 77030006812 HC BLD SAW RECIP STRY -B: Performed by: ORTHOPAEDIC SURGERY

## 2021-12-09 PROCEDURE — 74011250637 HC RX REV CODE- 250/637: Performed by: NURSE ANESTHETIST, CERTIFIED REGISTERED

## 2021-12-09 PROCEDURE — 77030007866 HC KT SPN ANES BBMI -B: Performed by: NURSE ANESTHETIST, CERTIFIED REGISTERED

## 2021-12-09 PROCEDURE — 76210000063 HC OR PH I REC FIRST 0.5 HR: Performed by: ORTHOPAEDIC SURGERY

## 2021-12-09 PROCEDURE — 76942 ECHO GUIDE FOR BIOPSY: CPT | Performed by: NURSE ANESTHETIST, CERTIFIED REGISTERED

## 2021-12-09 PROCEDURE — 74011000250 HC RX REV CODE- 250: Performed by: ORTHOPAEDIC SURGERY

## 2021-12-09 PROCEDURE — 74011000250 HC RX REV CODE- 250: Performed by: NURSE ANESTHETIST, CERTIFIED REGISTERED

## 2021-12-09 PROCEDURE — 77030013708 HC HNDPC SUC IRR PULS STRY –B: Performed by: ORTHOPAEDIC SURGERY

## 2021-12-09 PROCEDURE — 77030040393 HC DRSG OPTIFOAM GENT MDII -B: Performed by: ORTHOPAEDIC SURGERY

## 2021-12-09 PROCEDURE — 73560 X-RAY EXAM OF KNEE 1 OR 2: CPT

## 2021-12-09 PROCEDURE — 97161 PT EVAL LOW COMPLEX 20 MIN: CPT

## 2021-12-09 PROCEDURE — 77030040361 HC SLV COMPR DVT MDII -B: Performed by: ORTHOPAEDIC SURGERY

## 2021-12-09 PROCEDURE — 77030041690 HC SYS PINNING KN JNJ -D: Performed by: ORTHOPAEDIC SURGERY

## 2021-12-09 PROCEDURE — 77030003601 HC NDL NRV BLK BBMI -A: Performed by: NURSE ANESTHETIST, CERTIFIED REGISTERED

## 2021-12-09 PROCEDURE — 74011000258 HC RX REV CODE- 258: Performed by: NURSE ANESTHETIST, CERTIFIED REGISTERED

## 2021-12-09 PROCEDURE — 74011250637 HC RX REV CODE- 250/637: Performed by: NURSE PRACTITIONER

## 2021-12-09 PROCEDURE — 77030029372 HC ADH SKN CLSR PRINEO J&J -C: Performed by: ORTHOPAEDIC SURGERY

## 2021-12-09 PROCEDURE — 77030018673: Performed by: ORTHOPAEDIC SURGERY

## 2021-12-09 PROCEDURE — 77030003666 HC NDL SPINAL BD -A: Performed by: NURSE ANESTHETIST, CERTIFIED REGISTERED

## 2021-12-09 PROCEDURE — 77030031139 HC SUT VCRL2 J&J -A: Performed by: ORTHOPAEDIC SURGERY

## 2021-12-09 PROCEDURE — 64450 NJX AA&/STRD OTHER PN/BRANCH: CPT | Performed by: NURSE ANESTHETIST, CERTIFIED REGISTERED

## 2021-12-09 PROCEDURE — 76060000035 HC ANESTHESIA 2 TO 2.5 HR: Performed by: ORTHOPAEDIC SURGERY

## 2021-12-09 PROCEDURE — 77030000032 HC CUF TRNQT ZIMM -B: Performed by: ORTHOPAEDIC SURGERY

## 2021-12-09 PROCEDURE — 74011250636 HC RX REV CODE- 250/636: Performed by: NURSE PRACTITIONER

## 2021-12-09 PROCEDURE — 77030013079 HC BLNKT BAIR HGGR 3M -A: Performed by: NURSE ANESTHETIST, CERTIFIED REGISTERED

## 2021-12-09 PROCEDURE — 62270 DX LMBR SPI PNXR: CPT | Performed by: NURSE ANESTHETIST, CERTIFIED REGISTERED

## 2021-12-09 PROCEDURE — 74011000272 HC RX REV CODE- 272: Performed by: ORTHOPAEDIC SURGERY

## 2021-12-09 PROCEDURE — G0378 HOSPITAL OBSERVATION PER HR: HCPCS

## 2021-12-09 PROCEDURE — 76010000131 HC OR TIME 2 TO 2.5 HR: Performed by: ORTHOPAEDIC SURGERY

## 2021-12-09 PROCEDURE — 77030010783 HC BOWL MX BN CEM J&J -B: Performed by: ORTHOPAEDIC SURGERY

## 2021-12-09 DEVICE — KNEE K1 TOT HEMI STD CEM IMPL CAPPED SYNTHES: Type: IMPLANTABLE DEVICE | Site: KNEE | Status: FUNCTIONAL

## 2021-12-09 DEVICE — BASEPLATE TIB SZ 4 KNEE ROT PLATFRM CEM SYS ATTUNE: Type: IMPLANTABLE DEVICE | Site: KNEE | Status: FUNCTIONAL

## 2021-12-09 DEVICE — COMPONENT PAT DIA35MM KNEE POLY DOME CEM MEDIALIZED ATTUNE: Type: IMPLANTABLE DEVICE | Site: KNEE | Status: FUNCTIONAL

## 2021-12-09 DEVICE — INSERT TIB SZ 5 THK7MM KNEE POST STBL ROT PLATFRM ATTUNE: Type: IMPLANTABLE DEVICE | Site: KNEE | Status: FUNCTIONAL

## 2021-12-09 DEVICE — CEMENT BNE 40GM FULL DOSE PMMA W/ GENT HI VISC RADPQ LNG: Type: IMPLANTABLE DEVICE | Site: KNEE | Status: FUNCTIONAL

## 2021-12-09 DEVICE — COMPONENT FEM SZ 5 L KNEE NAR POST STBL CEM ATTUNE: Type: IMPLANTABLE DEVICE | Site: KNEE | Status: FUNCTIONAL

## 2021-12-09 RX ORDER — SENNOSIDES 8.6 MG/1
1 TABLET ORAL 2 TIMES DAILY
Status: DISCONTINUED | OUTPATIENT
Start: 2021-12-09 | End: 2021-12-10 | Stop reason: HOSPADM

## 2021-12-09 RX ORDER — FACIAL-BODY WIPES
10 EACH TOPICAL DAILY PRN
Status: DISCONTINUED | OUTPATIENT
Start: 2021-12-09 | End: 2021-12-10 | Stop reason: HOSPADM

## 2021-12-09 RX ORDER — SODIUM CHLORIDE, SODIUM LACTATE, POTASSIUM CHLORIDE, CALCIUM CHLORIDE 600; 310; 30; 20 MG/100ML; MG/100ML; MG/100ML; MG/100ML
25 INJECTION, SOLUTION INTRAVENOUS CONTINUOUS
Status: DISCONTINUED | OUTPATIENT
Start: 2021-12-09 | End: 2021-12-09 | Stop reason: HOSPADM

## 2021-12-09 RX ORDER — LABETALOL HYDROCHLORIDE 5 MG/ML
INJECTION, SOLUTION INTRAVENOUS AS NEEDED
Status: DISCONTINUED | OUTPATIENT
Start: 2021-12-09 | End: 2021-12-09 | Stop reason: HOSPADM

## 2021-12-09 RX ORDER — MELATONIN
2000 DAILY
Status: DISCONTINUED | OUTPATIENT
Start: 2021-12-10 | End: 2021-12-10 | Stop reason: HOSPADM

## 2021-12-09 RX ORDER — ONDANSETRON 2 MG/ML
INJECTION INTRAMUSCULAR; INTRAVENOUS AS NEEDED
Status: DISCONTINUED | OUTPATIENT
Start: 2021-12-09 | End: 2021-12-09 | Stop reason: HOSPADM

## 2021-12-09 RX ORDER — MAGNESIUM SULFATE 100 %
4 CRYSTALS MISCELLANEOUS AS NEEDED
Status: DISCONTINUED | OUTPATIENT
Start: 2021-12-09 | End: 2021-12-09 | Stop reason: HOSPADM

## 2021-12-09 RX ORDER — NALOXONE HYDROCHLORIDE 0.4 MG/ML
0.4 INJECTION, SOLUTION INTRAMUSCULAR; INTRAVENOUS; SUBCUTANEOUS AS NEEDED
Status: DISCONTINUED | OUTPATIENT
Start: 2021-12-09 | End: 2021-12-10 | Stop reason: HOSPADM

## 2021-12-09 RX ORDER — DEXTROSE 50 % IN WATER (D50W) INTRAVENOUS SYRINGE
25-50 AS NEEDED
Status: DISCONTINUED | OUTPATIENT
Start: 2021-12-09 | End: 2021-12-09 | Stop reason: HOSPADM

## 2021-12-09 RX ORDER — PROPOFOL 10 MG/ML
INJECTION, EMULSION INTRAVENOUS AS NEEDED
Status: DISCONTINUED | OUTPATIENT
Start: 2021-12-09 | End: 2021-12-09 | Stop reason: HOSPADM

## 2021-12-09 RX ORDER — FLUMAZENIL 0.1 MG/ML
0.2 INJECTION INTRAVENOUS
Status: DISCONTINUED | OUTPATIENT
Start: 2021-12-09 | End: 2021-12-09 | Stop reason: HOSPADM

## 2021-12-09 RX ORDER — ACETAMINOPHEN 500 MG
1000 TABLET ORAL ONCE
Status: COMPLETED | OUTPATIENT
Start: 2021-12-09 | End: 2021-12-09

## 2021-12-09 RX ORDER — THERA TABS 400 MCG
1 TAB ORAL DAILY
Status: DISCONTINUED | OUTPATIENT
Start: 2021-12-10 | End: 2021-12-10 | Stop reason: HOSPADM

## 2021-12-09 RX ORDER — ONDANSETRON 2 MG/ML
4 INJECTION INTRAMUSCULAR; INTRAVENOUS ONCE
Status: DISCONTINUED | OUTPATIENT
Start: 2021-12-09 | End: 2021-12-09 | Stop reason: HOSPADM

## 2021-12-09 RX ORDER — BUPIVACAINE HYDROCHLORIDE 5 MG/ML
INJECTION, SOLUTION EPIDURAL; INTRACAUDAL
Status: SHIPPED | OUTPATIENT
Start: 2021-12-09 | End: 2021-12-09

## 2021-12-09 RX ORDER — SODIUM CHLORIDE 0.9 % (FLUSH) 0.9 %
5-40 SYRINGE (ML) INJECTION EVERY 8 HOURS
Status: DISCONTINUED | OUTPATIENT
Start: 2021-12-09 | End: 2021-12-10 | Stop reason: HOSPADM

## 2021-12-09 RX ORDER — SODIUM CHLORIDE 0.9 % (FLUSH) 0.9 %
5-40 SYRINGE (ML) INJECTION AS NEEDED
Status: DISCONTINUED | OUTPATIENT
Start: 2021-12-09 | End: 2021-12-09 | Stop reason: HOSPADM

## 2021-12-09 RX ORDER — DIPHENHYDRAMINE HYDROCHLORIDE 50 MG/ML
12.5 INJECTION, SOLUTION INTRAMUSCULAR; INTRAVENOUS
Status: DISCONTINUED | OUTPATIENT
Start: 2021-12-09 | End: 2021-12-10 | Stop reason: HOSPADM

## 2021-12-09 RX ORDER — OXYCODONE AND ACETAMINOPHEN 10; 325 MG/1; MG/1
1 TABLET ORAL
Status: DISCONTINUED | OUTPATIENT
Start: 2021-12-09 | End: 2021-12-10

## 2021-12-09 RX ORDER — NALOXONE HYDROCHLORIDE 0.4 MG/ML
0.1 INJECTION, SOLUTION INTRAMUSCULAR; INTRAVENOUS; SUBCUTANEOUS
Status: DISCONTINUED | OUTPATIENT
Start: 2021-12-09 | End: 2021-12-09 | Stop reason: HOSPADM

## 2021-12-09 RX ORDER — SODIUM CHLORIDE 0.9 % (FLUSH) 0.9 %
5-40 SYRINGE (ML) INJECTION AS NEEDED
Status: DISCONTINUED | OUTPATIENT
Start: 2021-12-09 | End: 2021-12-10 | Stop reason: HOSPADM

## 2021-12-09 RX ORDER — ACETAMINOPHEN 325 MG/1
650 TABLET ORAL
Status: DISCONTINUED | OUTPATIENT
Start: 2021-12-09 | End: 2021-12-10 | Stop reason: HOSPADM

## 2021-12-09 RX ORDER — OXYCODONE AND ACETAMINOPHEN 5; 325 MG/1; MG/1
2 TABLET ORAL
Status: DISCONTINUED | OUTPATIENT
Start: 2021-12-09 | End: 2021-12-10

## 2021-12-09 RX ORDER — ONDANSETRON 2 MG/ML
4 INJECTION INTRAMUSCULAR; INTRAVENOUS
Status: DISCONTINUED | OUTPATIENT
Start: 2021-12-09 | End: 2021-12-10 | Stop reason: HOSPADM

## 2021-12-09 RX ORDER — GABAPENTIN 300 MG/1
300 CAPSULE ORAL ONCE
Status: COMPLETED | OUTPATIENT
Start: 2021-12-09 | End: 2021-12-09

## 2021-12-09 RX ORDER — BUPIVACAINE HYDROCHLORIDE 2.5 MG/ML
INJECTION, SOLUTION EPIDURAL; INFILTRATION; INTRACAUDAL AS NEEDED
Status: DISCONTINUED | OUTPATIENT
Start: 2021-12-09 | End: 2021-12-09 | Stop reason: HOSPADM

## 2021-12-09 RX ORDER — FENTANYL CITRATE 50 UG/ML
50 INJECTION, SOLUTION INTRAMUSCULAR; INTRAVENOUS AS NEEDED
Status: DISCONTINUED | OUTPATIENT
Start: 2021-12-09 | End: 2021-12-09 | Stop reason: HOSPADM

## 2021-12-09 RX ORDER — DEXAMETHASONE SODIUM PHOSPHATE 4 MG/ML
INJECTION, SOLUTION INTRA-ARTICULAR; INTRALESIONAL; INTRAMUSCULAR; INTRAVENOUS; SOFT TISSUE
Status: SHIPPED | OUTPATIENT
Start: 2021-12-09 | End: 2021-12-09

## 2021-12-09 RX ORDER — LEVOTHYROXINE SODIUM 50 UG/1
50 TABLET ORAL DAILY
Status: DISCONTINUED | OUTPATIENT
Start: 2021-12-10 | End: 2021-12-10 | Stop reason: HOSPADM

## 2021-12-09 RX ORDER — LIDOCAINE HYDROCHLORIDE 20 MG/ML
INJECTION, SOLUTION EPIDURAL; INFILTRATION; INTRACAUDAL; PERINEURAL AS NEEDED
Status: DISCONTINUED | OUTPATIENT
Start: 2021-12-09 | End: 2021-12-09 | Stop reason: HOSPADM

## 2021-12-09 RX ORDER — DIPHENHYDRAMINE HYDROCHLORIDE 50 MG/ML
12.5 INJECTION, SOLUTION INTRAMUSCULAR; INTRAVENOUS
Status: DISCONTINUED | OUTPATIENT
Start: 2021-12-09 | End: 2021-12-09 | Stop reason: HOSPADM

## 2021-12-09 RX ORDER — CLINDAMYCIN PHOSPHATE 900 MG/50ML
900 INJECTION INTRAVENOUS EVERY 8 HOURS
Status: COMPLETED | OUTPATIENT
Start: 2021-12-09 | End: 2021-12-10

## 2021-12-09 RX ADMIN — ONDANSETRON 4 MG: 2 INJECTION INTRAMUSCULAR; INTRAVENOUS at 18:24

## 2021-12-09 RX ADMIN — CLINDAMYCIN PHOSPHATE 900 MG: 900 INJECTION, SOLUTION INTRAVENOUS at 20:32

## 2021-12-09 RX ADMIN — VANCOMYCIN HYDROCHLORIDE 1500 MG: 1.5 INJECTION, POWDER, LYOPHILIZED, FOR SOLUTION INTRAVENOUS at 07:31

## 2021-12-09 RX ADMIN — ONDANSETRON 4 MG: 2 INJECTION INTRAMUSCULAR; INTRAVENOUS at 13:15

## 2021-12-09 RX ADMIN — PROPOFOL 20 MG: 10 INJECTION, EMULSION INTRAVENOUS at 07:46

## 2021-12-09 RX ADMIN — LABETALOL HYDROCHLORIDE 10 MG: 5 INJECTION INTRAVENOUS at 08:47

## 2021-12-09 RX ADMIN — TRANEXAMIC ACID 1 G: 1 INJECTION, SOLUTION INTRAVENOUS at 08:34

## 2021-12-09 RX ADMIN — Medication 10 ML: at 13:12

## 2021-12-09 RX ADMIN — BUPIVACAINE HYDROCHLORIDE 8 MG: 5 INJECTION, SOLUTION EPIDURAL; INTRACAUDAL at 08:25

## 2021-12-09 RX ADMIN — SODIUM CHLORIDE, POTASSIUM CHLORIDE, SODIUM LACTATE AND CALCIUM CHLORIDE 25 ML/HR: 600; 310; 30; 20 INJECTION, SOLUTION INTRAVENOUS at 07:12

## 2021-12-09 RX ADMIN — BUPIVACAINE HYDROCHLORIDE 25 ML: 5 INJECTION, SOLUTION EPIDURAL; INTRACAUDAL; PERINEURAL at 07:49

## 2021-12-09 RX ADMIN — Medication 10 ML: at 21:31

## 2021-12-09 RX ADMIN — OXYCODONE AND ACETAMINOPHEN 1 TABLET: 10; 325 TABLET ORAL at 21:31

## 2021-12-09 RX ADMIN — TRANEXAMIC ACID 1 G: 1 INJECTION, SOLUTION INTRAVENOUS at 09:08

## 2021-12-09 RX ADMIN — ACETAMINOPHEN 1000 MG: 500 TABLET ORAL at 07:17

## 2021-12-09 RX ADMIN — LIDOCAINE HYDROCHLORIDE 40 MG: 20 INJECTION, SOLUTION EPIDURAL; INFILTRATION; INTRACAUDAL; PERINEURAL at 07:46

## 2021-12-09 RX ADMIN — ONDANSETRON HYDROCHLORIDE 4 MG: 2 INJECTION, SOLUTION INTRAMUSCULAR; INTRAVENOUS at 08:47

## 2021-12-09 RX ADMIN — ACETAMINOPHEN 650 MG: 325 TABLET ORAL at 16:49

## 2021-12-09 RX ADMIN — SENNOSIDES 8.6 MG: 8.6 TABLET, FILM COATED ORAL at 17:10

## 2021-12-09 RX ADMIN — DEXAMETHASONE SODIUM PHOSPHATE 8 MG: 4 INJECTION, SOLUTION INTRAMUSCULAR; INTRAVENOUS at 07:49

## 2021-12-09 RX ADMIN — CLINDAMYCIN PHOSPHATE 900 MG: 900 INJECTION, SOLUTION INTRAVENOUS at 11:52

## 2021-12-09 RX ADMIN — GABAPENTIN 300 MG: 300 CAPSULE ORAL at 07:17

## 2021-12-09 RX ADMIN — OXYCODONE AND ACETAMINOPHEN 1 TABLET: 10; 325 TABLET ORAL at 11:52

## 2021-12-09 RX ADMIN — SENNOSIDES 8.6 MG: 8.6 TABLET, FILM COATED ORAL at 11:52

## 2021-12-09 NOTE — PROGRESS NOTES
Problem: Falls - Risk of  Goal: *Absence of Falls  Description: Document Earma Misty Fall Risk and appropriate interventions in the flowsheet.   Outcome: Progressing Towards Goal  Note: Fall Risk Interventions:  Mobility Interventions: PT Consult for mobility concerns         Medication Interventions: Bed/chair exit alarm    Elimination Interventions: Call light in reach

## 2021-12-09 NOTE — ANESTHESIA PROCEDURE NOTES
Peripheral Block    Start time: 12/9/2021 7:45 AM  End time: 12/9/2021 7:49 AM  Performed by: Kashif Argueta CRNA  Authorized by: Kashif Argueta CRNA       Pre-procedure: Indications: post-op pain management    Preanesthetic Checklist: patient identified, risks and benefits discussed, site marked, timeout performed, anesthesia consent given and patient being monitored    Timeout Time: 07:45 EST          Block Type:   Block Type:   Adductor canal block  Laterality:  Left  Monitoring:  Standard ASA monitoring, continuous pulse ox, frequent vital sign checks, heart rate, oxygen and responsive to questions  Injection Technique:  Single shot  Procedures: ultrasound guided    Patient Position: supine  Prep: chlorhexidine    Location:  Mid thigh  Needle Type:  Ultraplex  Needle Gauge:  21 G  Needle Localization:  Ultrasound guidance and anatomical landmarks  Medication Injected:  Bupivacaine (PF) (MARCAINE) 0.5% injection, 25 mL  dexamethasone (DECADRON) 4 mg/mL injection, 8 mg  Med Admin Time: 12/9/2021 7:49 AM    Assessment:  Number of attempts:  1  Injection Assessment:  Incremental injection every 5 mL, local visualized surrounding nerve on ultrasound, negative aspiration for blood, no intravascular symptoms, ultrasound image on chart and no paresthesia  Patient tolerance:  Patient tolerated the procedure well with no immediate complications

## 2021-12-09 NOTE — INTERVAL H&P NOTE
Update History & Physical    The Patient's History and Physical was reviewed with the patient. There was no change. The surgical site was confirmed by the patient and me. Patient understands and wants to proceed with the procedure. If applicable, I have discussed with the patient / power of  the rationale for blood component transfusion; its benefits in treating or preventing fatigue, organ damage, or death; and its risk which includes mild transfusion reactions, rare risk of blood borne infection, or more serious but rare reactions. I have discussed the alternatives to transfusion, including the risk and consequences of not receiving transfusion. The patient / Juan C Abdicer of  had an opportunity to ask questions and had agreed to proceed with transfusion of blood components. Plan:  The risk, benefits, expected outcome, and alternative to the recommended procedure have been discussed with the patient.       Electronically signed by HILL Hernandez on 12/9/2021 at 7:23 AM

## 2021-12-09 NOTE — ANESTHESIA PROCEDURE NOTES
Spinal Block    Start time: 12/9/2021 8:21 AM  End time: 12/9/2021 8:25 AM  Performed by: Deirdre Burns CRNA  Authorized by: Deirdre Burns CRNA     Pre-procedure:   Indications: primary anesthetic  Preanesthetic Checklist: patient identified, risks and benefits discussed, anesthesia consent, site marked, patient being monitored and timeout performed    Timeout Time: 07:21 EST          Spinal Block:   Patient Position:  Seated  Prep Region:  Lumbar      Location:  L3-4  Technique:  Single shot        Needle:   Needle Type:  Pencan  Needle Gauge:  22 G  Attempts:  1      Events: CSF confirmed, no blood with aspiration and no paresthesia        Assessment:  Insertion:  Uncomplicated  Patient tolerance:  Patient tolerated the procedure well with no immediate complications

## 2021-12-09 NOTE — PERIOP NOTES
TRANSFER - OUT REPORT:    Verbal report given to SOUMYA Siu LPN on Sydni  being transferred to  for routine post - op       Report consisted of patients Situation, Background, Assessment and   Recommendations(SBAR). Information from the following report(s) SBAR, OR Summary, Intake/Output, MAR, Cardiac Rhythm SR and Quality Measures was reviewed with the receiving nurse. Lines:   Peripheral IV 12/09/21 Distal; Posterior; Right Forearm (Active)   Site Assessment Clean, dry, & intact 12/09/21 1022   Phlebitis Assessment 0 12/09/21 1022   Infiltration Assessment 0 12/09/21 1022   Dressing Status Clean, dry, & intact 12/09/21 1022   Dressing Type Transparent 12/09/21 1022   Hub Color/Line Status Pink; Infusing; Patent 12/09/21 1022   Action Taken Blood drawn 12/09/21 0711   Alcohol Cap Used No 12/09/21 1022        Opportunity for questions and clarification was provided. Patient transported with:   Registered Nurse     Visit Vitals  BP (!) 133/46 (BP 1 Location: Left upper arm, BP Patient Position: At rest)   Pulse 75   Temp 97.1 °F (36.2 °C)   Resp 14   Wt 59.9 kg (132 lb)   SpO2 97%   BMI 25.78 kg/m²     Pt has phone and headphones in possession. Also Incentive Spirometer. Daughter has belongings. Pt in stable condition.

## 2021-12-09 NOTE — ANESTHESIA PREPROCEDURE EVALUATION
Relevant Problems   ENDOCRINE   (+) Arthritis       Anesthetic History   No history of anesthetic complications            Review of Systems / Medical History  Patient summary reviewed, nursing notes reviewed and pertinent labs reviewed    Pulmonary  Within defined limits                 Neuro/Psych   Within defined limits           Cardiovascular              Hyperlipidemia    Exercise tolerance: >4 METS     GI/Hepatic/Renal  Within defined limits              Endo/Other      Hypothyroidism  Arthritis     Other Findings              Physical Exam    Airway  Mallampati: II  TM Distance: 4 - 6 cm  Neck ROM: normal range of motion   Mouth opening: Normal     Cardiovascular  Regular rate and rhythm,  S1 and S2 normal,  no murmur, click, rub, or gallop  Rhythm: regular  Rate: normal         Dental  No notable dental hx       Pulmonary  Breath sounds clear to auscultation               Abdominal  GI exam deferred       Other Findings            Anesthetic Plan    ASA: 2  Anesthesia type: spinal, general - backup and regional - saphenous block      Post-op pain plan if not by surgeon: peripheral nerve block single    Induction: Intravenous  Anesthetic plan and risks discussed with: Patient and Son / Daughter      Patient requesting no to minimal sedation during procedure. Would instead like to listen to music on headphones. Concerned about strong family history of dementia. Counseled regarding effects of anesthesia and post-operative cognitive impairment. Patient still would like to proceed with just spinal and block if possible. Explained the necessary potential for general anesthesia should neuraxial fail. Additionally, reminded the patient to communicate comfort throughout procedure should they need or request any sedation.

## 2021-12-09 NOTE — DISCHARGE INSTRUCTIONS
TOTAL KNEE REPLACEMENT DISCHARGE INFORMATION    You have undergone a Total Knee Replacement. The following list is to provide you with some expectations over the next week upon your discharge from the hospital.     1. Please begin Eliquis every 12 hours (twice daily) starting tomorrow as directed until Dr. Char Alvares instructs you to discontinue it. If you are not sure which blood thinner to take please contact Dr. Walker Caceres office next business day for clarification. 2. Please be sure to continue your thigh-high compression stockings on both sides until instructed to discontinue them. 3. Over the course of the next week, you should continue thigh high stockings on the operative leg, DO NOT GET THE INCISION WET until instructed to do so. Please make sure the stockings on the operative leg are pulled up all the way to the thigh to prevent any creases which may result in abrasions or creases in the skin. If the stockings are creating creases resulting in abrasions or blistering on the operative leg please remove the stockings. You may take the stockings off on the nonoperative leg once you arrive home. 4. You may notice some bruising on your thigh and it may extend all the way to the ankles. That is perfectly normal early on.  5. You may experience a clicking noise in your knee and that is normal because of the artificial knee. 6. It is important to remember if you have any surgical procedure including dental procedures which may result in bleeding that an antibiotic 1 hour before the procedure will be required. Please let the provider performing the procedure know that you have artificial joint. If an antibiotic is not given by them please call our office and give us at least 5 business days to get you the appropriate antibiotics if needed. This rule applies indefinitely. 7. If an Ace wrap is placed on your knee you may remove the Ace wrap only 48 hours after your surgery.   We will leave the stockings on.  8. During the course of your  over the next week, should you experience fevers of 101.5 F, a white drainage from the incision, extreme redness around the incision, or the incision begins to have a pungent smell; Please call our office or page Dr. Barber Ruano whose numbers are provided in your discharge paperwork. To Page Dr. Barber Ruano please call [phone] and dial 0. Have the  page whomever is on call for Orthopedics. These are signs of infection and it should be addressed immediately. 9. Please do not drive until instructed to do so. 10. If you need a refill on pain medication please allow at least 2 business days notice for any refills. Immediate refill request may not be possible. Medication refill requests will not be addressed during non-business hours. Please do not page the on-call provider for pain medication refills after hours. 11. It is very important for you to begin your Outpatient Physical Therapy within a couple days of the day of your discharge and your appointment should have been set up. If your physical therapy has not been set up please call our office the next business day for assistance. Details provided in a separate sheet. 12. Remove ace wrap in 48 hrs after surgery but keep stockings on. 15. Finish all antibiotics, start the antibiotics as soon as you go home if you have prescribed antibiotics. 14.  You should perform your daily home exercises at least 4 times a day 30 minutes each time. Perform foot pumps on both feet at least 10 times every 15 minutes while awake. This helps prevent swelling in the leg and can help prevent blood clots in the leg. On the operative leg if you have significant swelling you can also lay down flat and put 3 pillows under the heel so the heel is above the heart level and then perform foot pumps 4 times a day for 10 minutes to help bring the swelling down. 15.  Do not place anything under your knee while sleeping at night.   Elevate your heel so your  is straight while sleeping at night. 16.  Perform deep breathing exercises 10 times every hour while awake. 17.  If you had a nerve block and you are not having pain the day of the surgery, at nighttime it is okay to take 1 pain medication before going to sleep to help prevent excruciating pain when the nerve block wears off. 18.  You may be given an ice pack machine use that to help prevent swelling. Do not apply heat to the incision area. 19.  While you are awake at least 10 times every 30 minutes move your foot up and down as if you are pumping gas from both feet to help prevent swelling and to promote blood circulation in the calf. 20.  If you develop sudden onset of shortness of breath or severe calf pain please go to closest emergency room. 21. Your pain medicine is a Narcotic and may cause constipation. You may take an over the counter stool softener while taking pain medicine. ICE THERAPY WRAP:    · Keep ice therapy wrap on when resting. · DO not wear when moving or walking. · Ice packs are reusable. · Ice Therapy wrap holds two ice packs at a time. Things to watch for:             Increased swelling of the surgical site             Spreading of redness around the incision site             Drainage of pus from the incision site             Developing a fever of 101.5 °F or higher             If any of these symptoms occur you have any questions please contact our office at [phone]. If you need to talk to Dr. Kristin Zamora on an urgent basis please call the hospital at [phone]. 0 for the . Please let the  know you are a surgical patient of Dr. Kristin Zamora and you wish to get in contact with him. If Dr. Kristin Zamora or his staff do not call you back within 30 minutes. Please tell the  to try again. Phone: [phone]  [email or web address]

## 2021-12-09 NOTE — PROGRESS NOTES
Received care of patient from PACU to room 259. Patient is alert and oriented. Patient cleaned of urine and positioned for comfort. VSS. Patient oriented to room and CB system. Patient denies any needs at this time.  CB in reach

## 2021-12-09 NOTE — ANESTHESIA POSTPROCEDURE EVALUATION
Procedure(s):  LEFT TKA. spinal, regional, MAC    Anesthesia Post Evaluation      Multimodal analgesia: multimodal analgesia used between 6 hours prior to anesthesia start to PACU discharge  Patient location during evaluation: PACU  Patient participation: complete - patient participated  Level of consciousness: awake and alert  Pain management: adequate  Airway patency: patent  Anesthetic complications: no  Cardiovascular status: acceptable  Respiratory status: acceptable  Hydration status: acceptable  Comments: Ok to discharge when standard criteria are met.    Post anesthesia nausea and vomiting:  none  Final Post Anesthesia Temperature Assessment:  Normothermia (36.0-37.5 degrees C)      INITIAL Post-op Vital signs:   Vitals Value Taken Time   /58 12/09/21 1023   Temp 36 °C (96.8 °F) 12/09/21 1023   Pulse 70 12/09/21 1023   Resp 14 12/09/21 1023   SpO2 99 % 12/09/21 1023

## 2021-12-09 NOTE — PROGRESS NOTES
Problem: Mobility Impaired (Adult and Pediatric)  Goal: *Acute Goals and Plan of Care (Insert Text)  Description: Physical Therapy Goals  Initiated 12/9/2021 and to be accomplished within 1 day(s)  1. Patient will move from supine to sit and sit to supine , scoot up and down, and roll side to side in bed with modified independence. 2.  Patient will transfer from bed to chair and chair to bed with modified independence using the least restrictive device. 3.  Patient will perform sit to stand with modified independence. 4.  Patient will ambulate with modified independence for 100 feet with the least restrictive device. 5.  Patient will ascend/descend 4 stairs with 2 handrail(s) with minimal assistance/contact guard assist.    PLOF: Community ambulator who did not use an AD and who was (I) with ADLs. Outcome: Progressing Towards Goal   PHYSICAL THERAPY EVALUATION     Patient: Tramaine Almanzar (13 y.o. female)  Date: 12/9/2021  Primary Diagnosis: Osteoarthritis of left knee, unspecified osteoarthritis type [M17.12]  Knee osteoarthritis [M17.10]  Osteoarthritis of left knee [M17.12]  Procedure(s) (LRB):  LEFT TKA (Left) Day of Surgery   Precautions:  WBAT    ASSESSMENT :  Based on the objective data described below, the patient presents s/p L TKA and she is WBAT. She is able to ambulate with a RW with CGA. She is very limited during session due to nausea. She is educated on a HEP and tolerates well. She would benefit from further P.T. to improve strength, gait, and stair navigation. She can return home with outpatient P.T. vs home health. PLAN :  Recommendations and Planned Interventions:   Patient to be seen 1-2x/day, 4-7 days/wk. Discharge Recommendations: Outpatient vs home health  Further Equipment Recommendations for Discharge: N/A     SUBJECTIVE:   Patient states I still feel nauseous when I sit up.     OBJECTIVE DATA SUMMARY:     Past Medical History:   Diagnosis Date    Arthritis Female cystocele     asymptomatic    High cholesterol     Hypothyroidism 11/09/2015    Mixed hyperlipidemia 01/14/2016    Subdural hematoma, post-traumatic ([facility] [address] 1985    Urinary incontinence      Past Surgical History:   Procedure Laterality Date    HX CARPAL TUNNEL RELEASE      HX CATARACT REMOVAL Bilateral 11/2011; 10/2011    both    HX CRANIOTOMY  1985    HX HYSTERECTOMY      HX OTHER SURGICAL  04/2010    R finger , 3rd finger Trigger release    HX OTHER SURGICAL  11/2009    Right hand excision Lipoma Dr. Emir Tran     Barriers to Learning/Limitations: None  Compensate with: N/A  Home Situation:   Home Situation  Home Environment: Private residence  # Steps to Enter: 5  Rails to Enter: Yes  Hand Rails : Right  One/Two Story Residence: One story  Living Alone: No  Support Systems: Child(demetria)  Patient Expects to be Discharged to[de-identified] House  Current DME Used/Available at Home: Walker, rolling  Critical Behavior:  Neurologic State: Alert  Orientation Level: Oriented X4     Skin Integrity: Incision (comment) (left knee)  Skin Integumentary  Skin Integrity: Incision (comment) (left knee)     Strength:    Strength: Generally decreased, functional (L knee 4/5, SLR 1050, 2.5 hours after spinal)  Tone & Sensation:   Sensation: Intact    Range Of Motion:   AROM: Generally decreased, functional (L knee 12-81)  PROM: Generally decreased, functional (L knee 11-89)    Posture:  Posture (WDL): Within defined limits    Functional Mobility:  Bed Mobility:  Rolling: Modified independent  Supine to Sit: Stand-by assistance  Sit to Supine: Stand-by assistance  Scooting: Stand-by assistance  Transfers:  Sit to Stand: Contact guard assistance  Stand to Sit: Contact guard assistance    Balance:   Sitting: Intact  Standing: Impaired  Standing - Static: Good  Standing - Dynamic : Fair  Ambulation/Gait Training:  Distance (ft): 30 Feet (ft) (and another 20')  Assistive Device: Walker, rolling  Ambulation - Level of Assistance: Contact guard assistance     Gait Description (WDL): Exceptions to WDL  Gait Abnormalities: Antalgic (L knee cassandra some, but improves with cues)     Left Side Weight Bearing: As tolerated  Today's TX:   Pt is able to ambulate with CGA  with a RW. She is educated on a HEP and states understanding. BP checked after 1st bout of gait and was normal.  Pain:  Pain level pre-treatment: 4-5/10   Pain level post-treatment: 6-7/10  Pain Location: L knee  Pain Intervention(s): Medication (see MAR); Rest, Ice, Repositioning   Response to intervention: Nurse notified, See doc flow    Activity Tolerance:   Poor  Please refer to the flowsheet for vital signs taken during this treatment. After treatment:   []         Patient left in no apparent distress sitting up in chair  [x]         Patient left in no apparent distress in bed  [x]         Call bell left within reach  [x]         Nursing notified  []         Caregiver present  []         Bed alarm activated  []         SCDs applied    COMMUNICATION/EDUCATION:   [x]         Role of Physical Therapy in the acute care setting. [x]         Fall prevention education was provided and the patient/caregiver indicated understanding. [x]         Patient/family have participated as able in goal setting and plan of care. [x]         Patient/family agree to work toward stated goals and plan of care. []         Patient understands intent and goals of therapy, but is neutral about his/her participation. []         Patient is unable to participate in goal setting/plan of care: ongoing with therapy staff.  []         Other:     Thank you for this referral.  Crystal Colindres, PT, DPT   Time Calculation: 25 mins

## 2021-12-09 NOTE — OP NOTES
Operative Note    Patient: Leatha Kenny MRN: [id number]  Surgery Date: 12/9/2021  [unfilled]          Procedure  Primary Surgeon    LEFT TKA  Wally Bragg MD    * Panel 2 does not exist *  * Panel 2 does not exist *    * Panel 3 does not exist *  * Panel 3 does not exist *     Surgeon(s) and Role:     * Wally Bragg MD - Primary    Other OR Staff/Assistants:  Circ-1: Feliz Hidden  Scrub Tech-1: Josse [facility] Arredondo: Isabela Sulaiman  Surg Asst-1: Radha Hamilton    1st Assistant Tasks:  Closing    Pre-operative Diagnosis:  Osteoarthritis of left knee, unspecified osteoarthritis type [M17.12]    Post-operative Diagnosis: same as preop diagnosis    Anesthesia Type: Spinal     Findings: djd    Complications: No    EBL: 50 cc    Specimens: None    Implants     Implant    [place] Sling Pelv Y Shp Sancho -- Meka Faina - [id number] - Implanted  Abdomen    Inventory item: MESH SYN Y 1401 [employer] FLAT SUT SURF MULTDIR POS FOR VAG COLPASSIST Model/Cat number: [id number]    : [employer] [address] [place] [address] Lot number: [id number]    Device identifier: [id number] Device identifier type: GS1    GUDID Information     Request status Successful      Brand name: Mer Alt[place]irano Version/Model: [id number]    Company name: [address] [place] [address] MRI safety info as of 11/26/19: Labeling does not contain MRI Safety Information    Contains dry or latex rubber: No      GMDN P.T. name: Pelvic organ prolapse surgical mesh, synthetic polymer            As of 11/26/2019     Status: Implanted                  Cement Bne 40gm Full Dose Pmma W/ Gent Hi Visc Radpq Lng - [id number] - Implanted   (Left) Knee    Inventory item: CEMENT BNE 40GM FULL DOSE PMMA W/ GENT HI VISC RADPQ LNG Model/Cat number: [id number]    : [employer]_ Lot number: [id number]    As of 12/9/2021     Status: Implanted                  Component Pat [id number] Knee Poly Dome Oneil Medialized Attune - [id number] - Implanted   (Left) Knee Inventory item: COMPONENT PAT [id number] KNEE POLY DOME ONEIL MEDIALIZED ATTUNE Model/Cat number: [id number]    : [employer] Lot number: [id number]    As of 12/9/2021     Status: Implanted                  Baseplate Tib Sz 4 Knee Rot Platfrm Oneil Sys Attune - [id number] - Implanted   (Left) Knee    Inventory item: BASEPLATE TIB SZ 4 KNEE ROT PLATFRM ONEIL SYS ATTUNE Model/Cat number: [id number]    : [employer] Lot number: [id number]    As of 12/9/2021     Status: Implanted                  Insert Tib Sz 5 Thk7mm Knee Post Stbl Rot Platfrm Attune - [id number] - Implanted   (Left) Knee    Inventory item: INSERT TIB SZ 5 THK7MM KNEE POST STBL ROT PLATFRM ATTUNE Model/Cat number: [id number]    : [employer] Lot number: [id number]    As of 12/9/2021     Status: Implanted                  Component Fem Sz 5 L Knee Kaila Post Stbl Oneil Attune - [id number] - Implanted   (Left) Knee    Inventory item: COMPONENT FEM SZ 5 L KNEE KAILA POST STBL ONEIL ATTUNE Model/Cat number: [id number]    : [employer] Lot number: [id number]    As of 12/9/2021     Status: Implanted                         OPERATIVE PROCEDURE:  Please note the first assistant role was to help in patient positioning and draping of the extremity in a sterile fashion. Also during the surgery the assistant's responsibilities included but not limited to extremity positioning during critical portions of the surgery. Assisting in using and placement of retractors during surgery. Lower extremity was prepped and draped in a sterile fashion. After adequate anesthesia was given, the patient was placed in a well-padded supine position. Subvastus arthrotomy from the tibial tubercle to the superior pole of the patella was made. Knee was hyperflexed. Intramedullary reaming of distal femur and proximal tibia was performed. 10 mm of distal femur was cut. Anterior-posterior sizing guide was used. Anterior, posterior, chamfer cuts, and box cuts were made next. Proximal tibial cut and preparation performed. Posterior osteophyte meniscal remnants were removed, and also patella was everted. Free-hand cut of the patella was made. Trial components were placed. The patient was found to have excellent range of motion and stability with all trial components. All the trial components removed. Copious irrigation performed. Distal femur, proximal tibia, and patella were impacted in place. Excessive cement was removed. After the cement was hard, Subvastus arthrotomy closed with Vicryl and Prineo stitch. Compressive dressing was applied. The patient was taken to PACU in stable condition.       Sidney Reich MD

## 2021-12-10 VITALS
BODY MASS INDEX: 25.78 KG/M2 | OXYGEN SATURATION: 97 % | DIASTOLIC BLOOD PRESSURE: 44 MMHG | HEART RATE: 77 BPM | SYSTOLIC BLOOD PRESSURE: 147 MMHG | TEMPERATURE: 97 F | RESPIRATION RATE: 16 BRPM | WEIGHT: 132 LBS

## 2021-12-10 LAB
ANION GAP SERPL CALC-SCNC: 10 MMOL/L
BASOPHILS # BLD: 0 K/UL (ref 0–0.1)
BASOPHILS NFR BLD: 0 % (ref 0–2)
BUN SERPL-MCNC: 18 MG/DL (ref 9–21)
BUN/CREAT SERPL: 30
CA-I BLD-MCNC: 9.2 MG/DL (ref 8.5–10.5)
CHLORIDE SERPL-SCNC: 104 MMOL/L (ref 94–111)
CO2 SERPL-SCNC: 23 MMOL/L (ref 21–33)
CREAT SERPL-MCNC: 0.6 MG/DL (ref 0.7–1.2)
DIFFERENTIAL METHOD BLD: ABNORMAL
EOSINOPHIL # BLD: 0 K/UL (ref 0–0.4)
EOSINOPHIL NFR BLD: 0 % (ref 0–5)
ERYTHROCYTE [DISTWIDTH] IN BLOOD BY AUTOMATED COUNT: 14 % (ref 11.6–14.5)
GLUCOSE SERPL-MCNC: 151 MG/DL (ref 70–110)
HCT VFR BLD AUTO: 37.5 % (ref 35–45)
HGB BLD-MCNC: 12.1 G/DL (ref 12–16)
IMM GRANULOCYTES # BLD AUTO: 0 K/UL (ref 0–0.04)
IMM GRANULOCYTES NFR BLD AUTO: 0 % (ref 0–0.5)
LYMPHOCYTES # BLD: 2.1 K/UL (ref 0.9–3.6)
LYMPHOCYTES NFR BLD: 19 % (ref 21–52)
MCH RBC QN AUTO: 30.4 PG (ref 24–34)
MCHC RBC AUTO-ENTMCNC: 32.3 G/DL (ref 31–37)
MCV RBC AUTO: 94.2 FL (ref 78–100)
MONOCYTES # BLD: 0.9 K/UL (ref 0.05–1.2)
MONOCYTES NFR BLD: 8 % (ref 3–10)
NEUTS SEG # BLD: 8 K/UL (ref 1.8–8)
NEUTS SEG NFR BLD: 73 % (ref 40–73)
NRBC # BLD: 0 K/UL (ref 0–0.01)
NRBC BLD-RTO: 0 PER 100 WBC
PLATELET # BLD AUTO: 283 K/UL (ref 135–420)
PMV BLD AUTO: 10 FL (ref 9.2–11.8)
POTASSIUM SERPL-SCNC: 4.1 MMOL/L (ref 3.2–5.1)
RBC # BLD AUTO: 3.98 M/UL (ref 4.2–5.3)
SODIUM SERPL-SCNC: 137 MMOL/L (ref 135–145)
WBC # BLD AUTO: 11.1 K/UL (ref 4.6–13.2)

## 2021-12-10 PROCEDURE — 74011250637 HC RX REV CODE- 250/637: Performed by: NURSE PRACTITIONER

## 2021-12-10 PROCEDURE — 36415 COLL VENOUS BLD VENIPUNCTURE: CPT

## 2021-12-10 PROCEDURE — 80048 BASIC METABOLIC PNL TOTAL CA: CPT

## 2021-12-10 PROCEDURE — 96374 THER/PROPH/DIAG INJ IV PUSH: CPT

## 2021-12-10 PROCEDURE — 74011250637 HC RX REV CODE- 250/637: Performed by: INTERNAL MEDICINE

## 2021-12-10 PROCEDURE — G0378 HOSPITAL OBSERVATION PER HR: HCPCS

## 2021-12-10 PROCEDURE — 97116 GAIT TRAINING THERAPY: CPT

## 2021-12-10 PROCEDURE — 97110 THERAPEUTIC EXERCISES: CPT

## 2021-12-10 PROCEDURE — 85025 COMPLETE CBC W/AUTO DIFF WBC: CPT

## 2021-12-10 PROCEDURE — 74011250636 HC RX REV CODE- 250/636: Performed by: NURSE PRACTITIONER

## 2021-12-10 RX ORDER — HYDROCODONE BITARTRATE AND ACETAMINOPHEN 5; 325 MG/1; MG/1
0.5 TABLET ORAL
Status: DISCONTINUED | OUTPATIENT
Start: 2021-12-10 | End: 2021-12-10 | Stop reason: HOSPADM

## 2021-12-10 RX ORDER — HYDROCODONE BITARTRATE AND ACETAMINOPHEN 5; 325 MG/1; MG/1
0.5 TABLET ORAL
Qty: 12 TABLET | Refills: 0 | Status: SHIPPED | OUTPATIENT
Start: 2021-12-10 | End: 2021-12-16

## 2021-12-10 RX ORDER — MORPHINE SULFATE 2 MG/ML
2 INJECTION, SOLUTION INTRAMUSCULAR; INTRAVENOUS
Status: DISCONTINUED | OUTPATIENT
Start: 2021-12-10 | End: 2021-12-10 | Stop reason: HOSPADM

## 2021-12-10 RX ADMIN — ACETAMINOPHEN 650 MG: 325 TABLET ORAL at 09:09

## 2021-12-10 RX ADMIN — Medication 10 ML: at 06:45

## 2021-12-10 RX ADMIN — APIXABAN 2.5 MG: 2.5 TABLET, FILM COATED ORAL at 09:09

## 2021-12-10 RX ADMIN — ONDANSETRON 4 MG: 2 INJECTION INTRAMUSCULAR; INTRAVENOUS at 12:45

## 2021-12-10 RX ADMIN — THERA TABS 1 TABLET: TAB at 09:09

## 2021-12-10 RX ADMIN — Medication 2000 UNITS: at 09:09

## 2021-12-10 RX ADMIN — ONDANSETRON 4 MG: 2 INJECTION INTRAMUSCULAR; INTRAVENOUS at 09:09

## 2021-12-10 RX ADMIN — SENNOSIDES 8.6 MG: 8.6 TABLET, FILM COATED ORAL at 09:09

## 2021-12-10 RX ADMIN — CLINDAMYCIN PHOSPHATE 900 MG: 900 INJECTION, SOLUTION INTRAVENOUS at 03:29

## 2021-12-10 RX ADMIN — LEVOTHYROXINE SODIUM 50 MCG: 0.05 TABLET ORAL at 09:09

## 2021-12-10 RX ADMIN — HYDROCODONE BITARTRATE AND ACETAMINOPHEN 0.5 TABLET: 5; 325 TABLET ORAL at 13:36

## 2021-12-10 RX ADMIN — ONDANSETRON 4 MG: 2 INJECTION INTRAMUSCULAR; INTRAVENOUS at 03:29

## 2021-12-10 RX ADMIN — OXYCODONE AND ACETAMINOPHEN 1 TABLET: 10; 325 TABLET ORAL at 03:29

## 2021-12-10 NOTE — PROGRESS NOTES
0730- received care of patient lying in bed complaining of nausea vitals obtained. PRN zofran to be administered. Patient denies any other needs at this time.

## 2021-12-10 NOTE — PROGRESS NOTES
Problem: Mobility Impaired (Adult and Pediatric)  Goal: *Acute Goals and Plan of Care (Insert Text)  Description: Physical Therapy Goals  Initiated 12/9/2021 and to be accomplished within 1 day(s)  1. Patient will move from supine to sit and sit to supine , scoot up and down, and roll side to side in bed with modified independence. 2.  Patient will transfer from bed to chair and chair to bed with modified independence using the least restrictive device. 3.  Patient will perform sit to stand with modified independence. 4.  Patient will ambulate with modified independence for 100 feet with the least restrictive device. 5.  Patient will ascend/descend 4 stairs with 2 handrail(s) with minimal assistance/contact guard assist.    PLOF: Community ambulator who did not use an AD and who was (I) with ADLs. Outcome: Progressing Towards Goal   PHYSICAL THERAPY TREATMENT    Patient: Corina Walker (7 y.o. female)  Date: 12/10/2021  Primary Diagnosis: Osteoarthritis of left knee, unspecified osteoarthritis type [M17.12]  Knee osteoarthritis [M17.10]  Osteoarthritis of left knee [M17.12]  Procedure(s) (LRB):  LEFT TKA (Left) 1 Day Post-Op   Precautions:  WBAT    ASSESSMENT:  Pt needed encouragement to participate. She is feeling noxious during session. Pt came to sitting without difficulty then stands to ambulate into [place]. She then  performed step negotiation. Pt walks back to room and reviewed HEP. See below for treatment details. Progression toward goals: All goals met   [x]      Improving appropriately and progressing toward goals  []      Improving slowly and progressing toward goals  []      Not making progress toward goals and plan of care will be adjusted        PLAN :Patient continues to benefit from skilled intervention to address the above impairments. Continue treatment per established plan of care.     Recommendations and Planned Interventions:   Patient to be seen 1-2x/day, 4-7 days/wk. Discharge Recommendations: Outpatient vs home health  Further Equipment Recommendations for Discharge: N/A     SUBJECTIVE:   Patient states \"I just feel nauseous\"     OBJECTIVE DATA SUMMARY:        Functional Mobility:  Bed Mobility:  Rolling: Modified independent  Supine to Sit: Modified independent  Sit to Supine: Modified independent  Scooting: Modified independent  Transfers:  Sit to Stand: Modified independent  Stand to Sit: Modified independent    Balance:   Sitting: Intact  Standing: Intact  Standing - Static: Good  Standing - Dynamic : Good  Ambulation/Gait Training:  Distance (ft): 150 Feet (ft) (x2 seated rest )  Assistive Device: Walker, rolling  Ambulation - Level of Assistance: Modified independent        Gait Abnormalities: Antalgic; Step to gait     Left Side Weight Bearing: As tolerated  HEP handout given and reviewed. EXERCISE   Sets   Reps   Active Active Assist   Passive Self ROM   Comments   Ankle Pumps     [] [] [] Education    Quad Sets 1 5  [x] [] [] [] Hold for 5 secs   Hamstring curls 1 5 [x] [] [] []    Heel Slides 1 5 [x] [] [] [] Supine and Seated    Straight Leg Raises 1 5 [x] [x] [] []    LLLD Heel Prop   [x] [] [] [] Education    Long Arc Quads 1 5 [x] [] [] []        Pain:  Pain level pre-treatment: 4-5/10   Pain level post-treatment: 5/10  Pain Location: L knee  Pain Intervention(s): Medication (see MAR); Rest, Ice, Repositioning   Response to intervention: Nurse notified, See doc flow    Activity Tolerance:   Poor  Please refer to the flowsheet for vital signs taken during this treatment.   After treatment:   []         Patient left in no apparent distress sitting up in chair  [x]         Patient left in no apparent distress in bed  [x]         Call bell left within reach  [x]         Nursing notified  []         Caregiver present  []         Bed alarm activated  []         SCDs applied    COMMUNICATION/EDUCATION:   [x]         Role of Physical Therapy in the acute care setting. [x]         Fall prevention education was provided and the patient/caregiver indicated understanding. [x]         Patient/family have participated as able in goal setting and plan of care. [x]         Patient/family agree to work toward stated goals and plan of care. []         Patient understands intent and goals of therapy, but is neutral about his/her participation.   []         Patient is unable to participate in goal setting/plan of care: ongoing with therapy staff.  []         Other:      EDD Lowe   Time Calculation: 28 mins

## 2021-12-10 NOTE — PROGRESS NOTES
2022 - Rounded on pt, physical assessment and V/S complete. Pt c/o 5/10 constant soreness pain in left knee and when moving it increases to 9/10. Pt denies wanting Percocet d/t the nausea it causes pt. PRN Tylenol not available to give until 2100. Pt stated understanding. Pt requested to get up to bedside commode a little later and to preform oral care. Writer will assist pt. IV Clindo started via IV as ordered per MAR. Pt tolerated well. Snack offered and refused at this time. Safety measures in place, bed in low/locked position, gripper socks on pt, and CBWR. Will continue to monitor. 2117 - Rounded on pt. IV antibiotics complete and flushed IV. Pt tolerated well. Pt requested PRN Percocet instead of Tylenol. PRN percocet administered. Assisted pt in oral care and getting up to bedside commode to attempt to have BM, pt had large urine output and no BM. New ice packs applied to pt's knee. Pt tolerated well. Assisted pt back to bed and replaced brief and purewick. Safety measures remain in place, will continue to monitor. 2225 - Rounded on pt, pt denies any nausea at this time. No Zofran needed at this time. 0320 - Rounded on pt, pt had gotten out of bed without calling and experienced some confusion when waking up. Pt is dangling feet off side of bed while sitting up. Pt c/o nausea and 9/10 pain in left knee. PRN's offered and accepted. Writer administered PRN Zofran and Percocet to help with discomfort. Assisted pt back into bed, replaced purewick and brief. IV Clindo started as ordered per MAR. Safety measures in place, bed alarm on, bed in low/locked position, SRx3, and CBWR. Reminded pt to call before getting out of bed, pt stated understanding. Will continue to monitor. 7290 - Rounded on pt, pt resting in bed. No c/o pain or discomfort at this time. Safety measures in place, bed alarm on and CBWR.

## 2021-12-10 NOTE — PROGRESS NOTES
Patient states pain 9/10 but states she ready to go home. Daughter does not want to take patient home until pain is more controlled Dr Adithya Arreola called and states patient can take OTC motrin attempted to order but patient has a allergy to Asprin spoke with daughter she is unsure if her mother has ever taken motrin and patient states she does not want to take it. Daughter agreed to pickup patient since she has at the pharmacy and can take it with her pain medications.

## 2021-12-10 NOTE — H&P
History and Physical    Subjective:     Ernesto Rae is a 78 y.o. elderly female with a past medical history for osteoarthritis, hypothyroidism, and hyperlipidemia, patient presented to this facility today for a total left knee arthroplasty with Dr. Shanae Fall. Hospital medicine was consulted for overnight observation. Patient assessed at the bedside, at this time patient is alert and oriented x3, there are no acute signs and symptoms of distress at this time. Patient does complain of mild nausea, which she does have as needed Zofran ordered. Patient admitted for overnight observation. Past Medical History:   Diagnosis Date    Arthritis     Female cystocele     asymptomatic    High cholesterol     Hypothyroidism 2015    Mixed hyperlipidemia 2016    Subdural hematoma, post-traumatic ([facility] [address] 1985    Urinary incontinence       Past Surgical History:   Procedure Laterality Date    HX CARPAL TUNNEL RELEASE      HX CATARACT REMOVAL Bilateral 2011; 10/2011    both    HX CRANIOTOMY  1985    HX HYSTERECTOMY      HX OTHER SURGICAL  2010    R finger , 3rd finger Trigger release    HX OTHER SURGICAL  2009    Right hand excision Lipoma Dr. Feliz Salazar     Family History   Problem Relation Age of Onset    Coronary Art Dis Mother     Cancer Father         malignant neoplastic disease(brain)    Coronary Art Dis Sister     Other Brother         neurological finding      Social History     Tobacco Use    Smoking status: Former Smoker     Packs/day: 1.00     Years: 10.00     Pack years: 10.00     Quit date: 2000     Years since quittin.9    Smokeless tobacco: Never Used   Substance Use Topics    Alcohol use: Not Currently       Prior to Admission medications    Medication Sig Start Date End Date Taking?  Authorizing Provider   Lumigan 0.01 % ophthalmic drops INSTILL 1 DROP INTO BOTH EYES AT BEDTIME 21  Yes Provider, Historical   levothyroxine (synthroid) 50 mcg tablet Take 50 mcg by mouth daily. 7/13/21  Yes Provider, Historical   prednisoLONE acetate (PRED FORTE) 1 % ophthalmic suspension INSTILL 2 DROPS INTO EACH EAR ONCE A WEEK 3/23/21  Yes Provider, Historical   multivitamin (ONE A DAY) tablet Take 1 Tab by mouth daily. Yes Provider, Historical   cyanocobalamin (VITAMIN B12) 1,000 mcg/mL injection 1,000 mcg by IntraMUSCular route once. Yes Provider, Historical   pyridoxine, vitamin B6, (VITAMIN B-6) 100 mg tablet Take 100 mg by mouth daily. Yes Provider, Historical   magnesium 250 mg tab Take  by mouth. Yes Provider, Historical   Biotin 2,500 mcg cap Take  by mouth. Yes Provider, Historical   cholecalciferol, vitamin D3, (VITAMIN D3) 2,000 unit tab Take  by mouth. Yes Provider, Historical   SYNTHROID 50 mcg tablet Take 50 mcg by mouth Daily (before breakfast). 9/28/19  Yes Provider, Historical   oxyCODONE-acetaminophen (Percocet) 5-325 mg per tablet Take 1 Tablet by mouth every four to six (4-6) hours as needed for Pain for up to 14 days. Max Daily Amount: 6 Tablets. 12/2/21 12/16/21  HILL Huynh   ondansetron (ZOFRAN ODT) 4 mg disintegrating tablet Take 1 Tablet by mouth every eight (8) hours as needed for Nausea, Vomiting or Nausea or Vomiting for up to 20 doses. 12/2/21   HILL Huynh   apixaban (ELIQUIS) 2.5 mg tablet Take 1 Tablet by mouth two (2) times a day for 21 days. Indications: deep vein thrombosis prevention  Patient not taking: Reported on 12/9/2021 12/2/21 12/23/21  HILL Huynh   mirabegron ER (Myrbetriq) 25 mg ER tablet Take 1 Tab by mouth daily.   Patient not taking: Reported on 12/9/2021 4/29/21   BHARAT Lindo     Allergies   Allergen Reactions    Latex Rash    Iodinated Contrast Media Hives    Penicillins Hives    Aspirin Unknown (comments)    Black Pepper Rash     Other reaction(s): Abdominal Pain    Iodine Unknown (comments)    Lumigan [Bimatoprost] Other (comments)     Eye irritation and redness    Mint Unknown (comments)      MOUTH BURNING    Nitrofurantoin Hives    Tamsulosin Unknown (comments)    Wheat Itching    Yeast Swelling      REVIEW OF SYSTEMS:       Total of 12 systems reviewed as follows:    Positive = Red  Constitutional: Negative for malaise/fatigue and weakness, negative for fever and chills   HENT: Negative for ear pain, headaches, negative for loss of sense of taste and smell   Eyes: Negative for blurred vision and double vision   Skin: Negative for itching, negative for open areas   Cardiovascular:  Positive for nausea. Negative for chest pain, palpitations, negative for swelling   Respiratory: Negative for shortness or breath, negative for cough, negative for sputum production   Gastrointestinal: Negative for abdominal pain, constipation, vomiting, and diarrhea   Genitourinary: Negative for dysuria, frequency, and hematuria   Musculoskeletal: Negative for joint pain and myalgias   Neurological: Negative for dizziness, seizures, and headaches   Psychiatric: Negative for depression and anxiousness        Objective:   VITALS:    Visit Vitals  BP (!) 142/55   Pulse 77   Temp 97.6 °F (36.4 °C)   Resp 16   Wt 59.9 kg (132 lb)   SpO2 100%   BMI 25.78 kg/m²       PHYSICAL EXAM:  Positive = Red  Constitutional: Alert and oriented x 3 and no noted acute distress appears to be stated age. HENT: Atraumatic, nose midline, oropharynx clear ad moist, trachea midline, no supraclavicular   Eyes: Conjunctiva normal and pupils equal   Skin: Dry, intact, warm, and dry   Cardiovascular: Regular rate and rhythm, normal heart sounds, no murmurs, pulses palpable, no noted edema   Respiratory: Lungs clear throughout, no wheezes, rales, or rhonchi, effort normal   Gastrointestinal: Appearance normal, bowel sounds are normal, bowl soft and non-tender   Genitourinary: Deferred   Musculoskeletal:  Decreased range of motion to left lower extremity   Neurological: Alert and oriented x 3, awake. No facial droop.  No slurred speech. Hand grasps equal. Strength 5/5 in all extremities. Intact sensations   Psychiatric: Affect normal, Answers questions appropriately     __________________________________________________  Care Plan discussed with:    Comments   Patient X    Family      RN     Care Manager                    Consultant:      _______________________________________________________________________  Expected  Disposition:   Home with Family X   HH/PT/OT/RN    SNF/LTC    DELROY    ________________________________________________________________________    Labs:  Recent Results (from the past 24 hour(s))   TYPE & SCREEN    Collection Time: 12/09/21  7:08 AM   Result Value Ref Range    Crossmatch Expiration 12/12/2021,2359     ABO/Rh(D) A Positive     Antibody screen Negative        Imaging:  XR KNEE LT MAX 2 VWS    Result Date: 12/9/2021  EXAM: Left knee, 2 views COMPARISON: None. INDICATION: Left knee pain, status post left knee arthroplasty. _______________ FINDINGS: Portable AP and crosstable lateral views of the left knee were obtained. Surgical changes and hardware of total left knee arthroplasty. No lucency adjacent to the hardware to suggest complication. Expected postsurgical effusion, soft tissue swelling, and emphysema. No acute fracture. Adequate anatomic alignment of left knee prosthesis. _______________    Total left knee arthroplasty, without complication. Assessment & Plan:      Total left knee arthroplasty  -Secondary to osteoarthritis  -Continue as needed pain management  -Continue scheduled IV antibiotics  -Continue left knee brace with ice packs  -PT consulted    Nausea  -Likely secondary to anesthesia versus pain medicine  -As needed antiemetics    Hypothyroidism  -Chronic  -Continue Synthroid    History of DVT  -Plan is to restart Eliquis tomorrow  -Bilateral foot pumps in place for now    TOTAL TIME:  45 Minutes    Code Status: Full    Prophylaxis: Eliquis    Electronically Signed : Brianna Guerra, [facility] Medicine Service    Please note that this dictation was completed with AVIA, the computer voice recognition software. Quite often unanticipated grammatical, syntax, homophones, and other interpretive errors are inadvertently transcribed by the computer software. Please disregard these errors. Please excuse any errors that have escaped final proofreading. Thank you.

## 2021-12-10 NOTE — DISCHARGE SUMMARY
Discharge Summary       PATIENT ID: Jakob Goldman  MRN: [id number]   YOB: 1940    DATE OF ADMISSION: 12/9/2021  6:36 AM    DATE OF DISCHARGE: 12/10/21    PRIMARY CARE PROVIDER: Eric Ch MD     ATTENDING PHYSICIAN: Anna Jorge MD  DISCHARGING PROVIDER: Anna Jorge MD        CONSULTATIONS: IP CONSULT TO HOSPITALIST    PROCEDURES/SURGERIES: Procedure(s) with comments:  LEFT TKA - regional nerve block    ADMITTING [address]:   Jakob Goldman is a 78 y.o. elderly female with a past medical history for osteoarthritis, hypothyroidism, and hyperlipidemia, patient presented to this facility today for a total left knee arthroplasty with Dr. Emilie Del Castillo. Hospital medicine was consulted for overnight observation.       Patient assessed at the bedside, at this time patient is alert and oriented x3, there are no acute signs and symptoms of distress at this time. Patient does complain of mild nausea, which she does have as needed Zofran ordered. Patient admitted for overnight observation.         DISCHARGE DIAGNOSES / PLAN:      Assessment & Plan:      Total left knee arthroplasty  -Secondary to osteoarthritis  -Continue as needed pain management  -Continue scheduled IV antibiotics  -Continue left knee brace with ice packs  -PT consulted     Nausea  -Likely secondary to anesthesia versus pain medicine  -As needed antiemetics     Hypothyroidism  -Chronic  -Continue Synthroid     History of DVT  -Plan is to restart Eliquis tomorrow  -Bilateral foot pumps in place for now     TOTAL TIME:  45 Minutes      FOLLOW UP APPOINTMENTS:    Follow-up Information     Follow up With Specialties Details Why Contact Info    Gerda Johnson, [address] [employer] Nurse Practitioner In 1 week Follow up appointment Thursday 12/16 at 10:00am with LORENZO Dennis [phone]  [address] [place] [address], [address], MD Family Medicine   [address] [address]  [phone] DIET: regular      DISCHARGE MEDICATIONS:  Current Discharge Medication List      START taking these medications    Details   HYDROcodone-acetaminophen (NORCO) 5-325 mg per tablet Take 0.5 Tablets by mouth every six (6) hours as needed for Pain for up to 6 days. Max Daily Amount: 2 Tablets. Qty: 12 Tablet, Refills: 0  Start date: 12/10/2021, End date: 12/16/2021    Associated Diagnoses: Primary osteoarthritis of left knee         CONTINUE these medications which have NOT CHANGED    Details   Lumigan 0.01 % ophthalmic drops INSTILL 1 DROP INTO BOTH EYES AT BEDTIME      !! levothyroxine (synthroid) 50 mcg tablet Take 50 mcg by mouth daily. prednisoLONE acetate (PRED FORTE) 1 % ophthalmic suspension INSTILL 2 DROPS INTO EACH EAR ONCE A WEEK      multivitamin (ONE A DAY) tablet Take 1 Tab by mouth daily. cyanocobalamin (VITAMIN B12) 1,000 mcg/mL injection 1,000 mcg by IntraMUSCular route once. pyridoxine, vitamin B6, (VITAMIN B-6) 100 mg tablet Take 100 mg by mouth daily. magnesium 250 mg tab Take  by mouth. Biotin 2,500 mcg cap Take  by mouth. cholecalciferol, vitamin D3, (VITAMIN D3) 2,000 unit tab Take  by mouth.      !! SYNTHROID 50 mcg tablet Take 50 mcg by mouth Daily (before breakfast). ondansetron (ZOFRAN ODT) 4 mg disintegrating tablet Take 1 Tablet by mouth every eight (8) hours as needed for Nausea, Vomiting or Nausea or Vomiting for up to 20 doses. Qty: 20 Tablet, Refills: 0      apixaban (ELIQUIS) 2.5 mg tablet Take 1 Tablet by mouth two (2) times a day for 21 days. Indications: deep vein thrombosis prevention  Qty: 42 Tablet, Refills: 0      mirabegron ER (Myrbetriq) 25 mg ER tablet Take 1 Tab by mouth daily. Qty: 90 Tab, Refills: 1       !! - Potential duplicate medications found. Please discuss with provider.       STOP taking these medications       oxyCODONE-acetaminophen (Percocet) 5-325 mg per tablet Comments:   Reason for Stopping: NOTIFY YOUR PHYSICIAN FOR ANY OF THE FOLLOWING:   Fever over 101 degrees for 24 hours. Chest pain, shortness of breath, fever, chills, nausea, vomiting, diarrhea, change in mentation, falling, weakness, bleeding. Severe pain or pain not relieved by medications. Or, any other signs or symptoms that you may have questions about. PHYSICAL EXAMINATION AT DISCHARGE:  General:          Alert, cooperative, no distress, appears stated age. HEENT:           Atraumatic, anicteric sclerae, pink conjunctivae                          No oral ulcers, mucosa moist, throat clear, dentition fair  Neck:               Supple, symmetrical  Lungs:             Clear to auscultation bilaterally. No Wheezing or Rhonchi. No rales. Chest wall:      No tenderness  No Accessory muscle use. Heart:              Regular  rhythm,  No  murmur   No edema  Abdomen:        Soft, non-tender. Not distended. Bowel sounds normal  Extremities:     No cyanosis. No clubbing,                            Skin turgor normal, Capillary refill normal  Skin:                Not pale. Not Jaundiced  No rashes   Psych:             Not anxious or agitated. Neurologic:      Alert, moves all extremities, answers questions appropriately and responds to commands       [address] [place] [address]:  Problem List as of 12/10/2021 Date Reviewed: 12/9/2021          Codes Class Noted - Resolved    Knee osteoarthritis ICD-10-CM: M17.10  ICD-9-CM: 715.36  12/9/2021 - Present        Osteoarthritis of left knee ICD-10-CM: M17.12  ICD-9-CM: 715.96  12/9/2021 - Present        Dyslipidemia ICD-10-CM: E78.5  ICD-9-CM: 272.4  11/1/2021 - Present        Subdural hematoma ([facility] [address] ICD-10-CM: Q59.9A8W  ICD-9-CM: 432.1  11/1/2021 - Present    Overview Signed 11/1/2021  9:51 AM by Angelina Hauser MD     History of, status post craniotomy.              Acute sinusitis ICD-10-CM: J01.90  ICD-9-CM: 461.9  8/6/2021 - Present        Cataract ICD-10-CM: H26.9  ICD-9-CM: 366.9 8/6/2021 - Present        Disorder of bone and articular cartilage ICD-10-CM: M89.9, M94.9  ICD-9-CM: 733.90  8/6/2021 - Present        Glaucoma ICD-10-CM: H40.9  ICD-9-CM: 365.9  8/6/2021 - Present        Malaise and fatigue ICD-10-CM: R53.81, R53.83  ICD-9-CM: 780.79  8/6/2021 - Present        Arthritis ICD-10-CM: M19.90  ICD-9-CM: 716.90  5/6/2020 - Present        Abnormal thyroid exam ICD-10-CM: R94.6  ICD-9-CM: 246.9  5/6/2020 - Present        Vitiligo ICD-10-CM: L80  ICD-9-CM: 709.01  5/6/2020 - Present        Pelvic organ prolapse quantification stage 3 cystocele ICD-10-CM: N81.10  ICD-9-CM: 618.01  11/26/2019 - Present        Elevated blood-pressure reading without diagnosis of hypertension ICD-10-CM: R03.0  ICD-9-CM: 796.2  1/14/2016 - Present        Mixed hyperlipidemia ICD-10-CM: E78.2  ICD-9-CM: 272.2  1/14/2016 - Present        Osteoporosis ICD-10-CM: M81.0  ICD-9-CM: 733.00  1/14/2016 - Present        Overweight with body mass index (BMI) 25.0-29.9 ICD-10-CM: E66.3  ICD-9-CM: 278.02  1/14/2016 - Present        Proteinuria ICD-10-CM: R80.9  ICD-9-CM: 791.0  1/14/2016 - Present        Impacted cerumen ICD-10-CM: H61.20  ICD-9-CM: 380.4  2/13/2015 - Present        Pain in limb ICD-10-CM: M79.609  ICD-9-CM: 729.5  2/13/2015 - Present        Knee pain ICD-10-CM: M25.569  ICD-9-CM: 719.46  1/30/2015 - Present        Sarcoidosis ICD-10-CM: D86.9  ICD-9-CM: 135  12/1/2014 - Present        IBS (irritable bowel syndrome) ICD-10-CM: K58.9  ICD-9-CM: 564.1  1/1/2009 - Present        RESOLVED: Pre-operative clearance ICD-10-CM: O53.017  ICD-9-CM: V72.84  11/1/2021 - 12/1/2021        RESOLVED: Well adult health check ICD-10-CM: Z00.00  ICD-9-CM: V70.0  1/14/2016 - 9/5/2021              Greater than 35 minutes were spent with the patient on counseling and coordination of care    Signed:   Rosalva Aguero MD  12/10/2021  2:18 PM

## 2021-12-16 ENCOUNTER — TELEPHONE (OUTPATIENT)
Dept: ORTHOPEDIC SURGERY | Age: 81
End: 2021-12-16

## 2021-12-16 ENCOUNTER — OFFICE VISIT (OUTPATIENT)
Dept: ORTHOPEDIC SURGERY | Age: 81
End: 2021-12-16
Payer: MEDICARE

## 2021-12-16 DIAGNOSIS — Z96.652 STATUS POST LEFT KNEE REPLACEMENT: Primary | ICD-10-CM

## 2021-12-16 PROCEDURE — 99024 POSTOP FOLLOW-UP VISIT: CPT | Performed by: NURSE PRACTITIONER

## 2021-12-16 RX ORDER — ONDANSETRON 4 MG/1
4 TABLET, ORALLY DISINTEGRATING ORAL
Qty: 20 TABLET | Refills: 0 | Status: SHIPPED | OUTPATIENT
Start: 2021-12-16

## 2021-12-16 NOTE — PROGRESS NOTES
Subjective:      Patient presents for postop care following left TKA. Surgery was on 12/9/2021. Ambulating with a walker. Pain is controlled with current analgesics. Medication(s) being used: Percocet. .    Objective: There were no vitals taken for this visit. General:  alert, cooperative, no distress, appears stated age   ROM: 0/95; good quad strength on extension   Incision:   healing well, no drainage, no erythema, incision well approximated, moderate swelling     Assessment:     Doing well postoperatively. Plan:     1. Continue PT. 2. Wound care/showering discussed. 3. Continue DVT prophylaxis as directed. 4. Follow up at 1 yr with Dr. Maryann Kaufman for xray's of the left knee and as needed. Sony Hernandez

## 2021-12-17 DIAGNOSIS — Z96.652 STATUS POST LEFT KNEE REPLACEMENT: Primary | ICD-10-CM

## 2021-12-17 RX ORDER — HYDROMORPHONE HYDROCHLORIDE 2 MG/1
2 TABLET ORAL
Qty: 30 TABLET | Refills: 0 | Status: SHIPPED | OUTPATIENT
Start: 2021-12-17 | End: 2021-12-31

## 2021-12-17 NOTE — TELEPHONE ENCOUNTER
Pt's daughter called in and stated that she wants to be put back on motrin. The  tylenol does not help her pain. She stated that she is allergic to asprin and would need a different type of blood thinner.

## 2021-12-22 ENCOUNTER — TELEPHONE (OUTPATIENT)
Dept: ORTHOPEDIC SURGERY | Age: 81
End: 2021-12-22

## 2021-12-22 NOTE — TELEPHONE ENCOUNTER
Phone call regarding pain medication. Orginally started off with percocet which made her nasus. Then she went to [facility] which did not help. She is currently taking Dalaudid. Can you please call her as she wants to get her medicines changes and possibly rotate the medication.

## 2022-01-14 ENCOUNTER — OFFICE VISIT (OUTPATIENT)
Dept: ORTHOPEDIC SURGERY | Age: 82
End: 2022-01-14
Payer: MEDICARE

## 2022-01-14 DIAGNOSIS — M25.562 LEFT KNEE PAIN, UNSPECIFIED CHRONICITY: Primary | ICD-10-CM

## 2022-01-14 PROCEDURE — 99024 POSTOP FOLLOW-UP VISIT: CPT | Performed by: ORTHOPAEDIC SURGERY

## 2022-01-14 NOTE — PROGRESS NOTES
Name: Amy Kennedy    : 1940     Service Dept: [address] [facility]    Chief Complaint   Patient presents with    Knee Pain    Surgical Follow-up        There were no vitals taken for this visit. Allergies   Allergen Reactions    Latex Rash    Iodinated Contrast Media Hives    Penicillins Hives    Aspirin Unknown (comments)    Black Pepper Rash     Other reaction(s): Abdominal Pain    Iodine Unknown (comments)    Lumigan [Bimatoprost] Other (comments)     Eye irritation and redness    Mint Unknown (comments)      MOUTH BURNING    Nitrofurantoin Hives    Tamsulosin Unknown (comments)    Wheat Itching    Yeast Swelling        Current Outpatient Medications   Medication Sig Dispense Refill    ondansetron (ZOFRAN ODT) 4 mg disintegrating tablet TAKE 1 TABLET BY MOUTH EVERY EIGHT (8) HOURS AS NEEDED FOR NAUSEA, VOMITING OR NAUSEA OR VOMITING FOR UP TO 20 DOSES. 20 Tablet 0    Lumigan 0.01 % ophthalmic drops INSTILL 1 DROP INTO BOTH EYES AT BEDTIME      levothyroxine (synthroid) 50 mcg tablet Take 50 mcg by mouth daily.  prednisoLONE acetate (PRED FORTE) 1 % ophthalmic suspension INSTILL 2 DROPS INTO EACH EAR ONCE A WEEK      mirabegron ER (Myrbetriq) 25 mg ER tablet Take 1 Tab by mouth daily. (Patient not taking: Reported on 2021) 90 Tab 1    multivitamin (ONE A DAY) tablet Take 1 Tab by mouth daily.  cyanocobalamin (VITAMIN B12) 1,000 mcg/mL injection 1,000 mcg by IntraMUSCular route once.  pyridoxine, vitamin B6, (VITAMIN B-6) 100 mg tablet Take 100 mg by mouth daily.  magnesium 250 mg tab Take  by mouth.  Biotin 2,500 mcg cap Take  by mouth.  cholecalciferol, vitamin D3, (VITAMIN D3) 2,000 unit tab Take  by mouth.  SYNTHROID 50 mcg tablet Take 50 mcg by mouth Daily (before breakfast).         Patient Active Problem List   Diagnosis Code    Pelvic organ prolapse quantification stage 3 cystocele N81.10    Acute sinusitis J01.90    Arthritis M19.90    Cataract H26.9    Disorder of bone and articular cartilage M89.9, M94.9    Elevated blood-pressure reading without diagnosis of hypertension R03.0    Glaucoma H40.9    Abnormal thyroid exam R94.6    IBS (irritable bowel syndrome) K58.9    Impacted cerumen H61.20    Knee pain M25.569    Malaise and fatigue R53.81, R53.83    Mixed hyperlipidemia E78.2    Osteoporosis M81.0    Overweight with body mass index (BMI) 25.0-29.9 E66.3    Pain in limb M79.609    Proteinuria R80.9    Sarcoidosis D86.9    Vitiligo L80    Dyslipidemia E78.5    Subdural hematoma (HCC) S06.5X9A    Knee osteoarthritis M17.10    Osteoarthritis of left knee M17.12      Family History   Problem Relation Age of Onset    Coronary Art Dis Mother     Cancer Father         malignant neoplastic disease(brain)    Coronary Art Dis Sister     Other Brother         neurological finding      Social History     Socioeconomic History    Marital status:    Tobacco Use    Smoking status: Former Smoker     Packs/day: 1.00     Years: 10.00     Pack years: 10.00     Quit date: 2000     Years since quittin.0    Smokeless tobacco: Never Used   Vaping Use    Vaping Use: Never used   Substance and Sexual Activity    Alcohol use: Not Currently    Drug use: Never      Past Surgical History:   Procedure Laterality Date    HX CARPAL TUNNEL RELEASE      HX CATARACT REMOVAL Bilateral 2011; 10/2011    both    HX CRANIOTOMY  1985    HX HYSTERECTOMY      HX OTHER SURGICAL  2010    R finger , 3rd finger Trigger release    HX OTHER SURGICAL  2009    Right hand excision Lipoma Dr. Carine Hutchins      Past Medical History:   Diagnosis Date    Arthritis     Female cystocele     asymptomatic    High cholesterol     Hypothyroidism 2015    Mixed hyperlipidemia 2016    Subdural hematoma, post-traumatic (HCC)     Urinary incontinence         I have reviewed and agree with [address] and ROS and intake form in chart and the record furthermore I have reviewed prior medical record(s) regarding this patients care during this appointment. Review of Systems:   Patient is a pleasant appearing individual, appropriately dressed, well hydrated, well nourished, who is alert, appropriately oriented for age, and in no acute distress with a normal gait and normal affect who does not appear to be in any significant pain. Physical Exam:  Left knee - Neurovascularly intact with good cap refill, full range of motion and full strength, well healed incision noted, no swelling, no erythema, no instability. Right knee - Decrease range of motion with flexion, Some crepitation, Grossly neurovascularly intact, Good cap refill, No skin lesion, Moderate swelling, No gross instability, Some quadriceps weakness     Encounter Diagnoses     ICD-10-CM ICD-9-CM   1. Left knee pain, unspecified chronicity  M25.562 719.46       HPI:  The patient is here status post left total knee replacement, doing well. Has no complaints. Just a bit of soreness. Assessment/Plan:  Plan will be for yearly appointment and activities as tolerated started and we will go from there. As part of continued conservative pain management options the patient was advised to utilize Tylenol or OTC NSAIDS as long as it is not medically contraindicated. Return to Office: Follow-up and Dispositions    · Return in about 1 year (around 1/14/2023) for w/ Xrays. Scribed by Kieran Barry as dictated by Yvette Sosa. Love Alvarez MD.  Documentation True and Accepted Darien Alvarez MD

## 2022-01-14 NOTE — PATIENT INSTRUCTIONS
Knee Pain or Injury: Care Instructions  Your Care Instructions     Injuries are a common cause of knee problems. Sudden (acute) injuries may be caused by a direct blow to the knee. They can also be caused by abnormal twisting, bending, or falling on the knee. Pain, bruising, or swelling may be severe, and may start within minutes of the injury. Overuse is another cause of knee pain. Other causes are climbing stairs, kneeling, and other activities that use the knee. Everyday wear and tear, especially as you get older, also can cause knee pain. Rest, along with home treatment, often relieves pain and allows your knee to heal. If you have a serious knee injury, you may need tests and treatment. Follow-up care is a key part of your treatment and safety. Be sure to make and go to all appointments, and call your doctor if you are having problems. It's also a good idea to know your test results and keep a list of the medicines you take. How can you care for yourself at home? · Be safe with medicines. Read and follow all instructions on the label. ? If the doctor gave you a prescription medicine for pain, take it as prescribed. ? If you are not taking a prescription pain medicine, ask your doctor if you can take an over-the-counter medicine. · Rest and protect your knee. Take a break from any activity that may cause pain. · Put ice or a cold pack on your knee for 10 to 20 minutes at a time. Put a thin cloth between the ice and your skin. · Prop up a sore knee on a pillow when you ice it or anytime you sit or lie down for the next 3 days. Try to keep it above the level of your heart. This will help reduce swelling. · If your knee is not swollen, you can put moist heat, a heating pad, or a warm cloth on your knee. · If your doctor recommends an elastic bandage, sleeve, or other type of support for your knee, wear it as directed.   · Follow your doctor's instructions about how much weight you can put on your leg. Use a cane, crutches, or a walker as instructed. · Follow your doctor's instructions about activity during your healing process. If you can do mild exercise, slowly increase your activity. · Reach and stay at a healthy weight. Extra weight can strain the joints, especially the knees and hips, and make the pain worse. Losing even a few pounds may help. When should you call for help? Call 911 anytime you think you may need emergency care. For example, call if:    · You have symptoms of a blood clot in your lung (called a pulmonary embolism). These may include:  ? Sudden chest pain. ? Trouble breathing. ? Coughing up blood. Call your doctor now or seek immediate medical care if:    · You have severe or increasing pain.     · Your leg or foot turns cold or changes color.     · You cannot stand or put weight on your knee.     · Your knee looks twisted or bent out of shape.     · You cannot move your knee.     · You have signs of infection, such as:  ? Increased pain, swelling, warmth, or redness. ? Red streaks leading from the knee. ? Pus draining from a place on your knee. ? A fever.     · You have signs of a blood clot in your leg (called a deep vein thrombosis), such as:  ? Pain in your calf, back of the knee, thigh, or groin. ? Redness and swelling in your leg or groin. Watch closely for changes in your health, and be sure to contact your doctor if:    · You have tingling, weakness, or numbness in your knee.     · You have any new symptoms, such as swelling.     · You have bruises from a knee injury that last longer than 2 weeks.     · You do not get better as expected. Where can you learn more? Go to http://www.gray.com/  Enter K195 in the search box to learn more about \"Knee Pain or Injury: Care Instructions. \"  Current as of: July 1, 2021               Content Version: 13.0  © 9205-0327 Healthwise, Incorporated.    Care instructions adapted under license by [employer] (which disclaims liability or warranty for this information). If you have questions about a medical condition or this instruction, always ask your healthcare professional. [address] any warranty or liability for your use of this information.

## 2022-01-14 NOTE — LETTER
1/17/2022    Patient: Joyce Johnson   YOB: 1940   Date of Visit: 1/14/2022     Nalini Padilla MD  [address]  Via Fax: [phone]    Dear Nalini Padilla MD,      Thank you for referring Ms. Charan Karimi to [address] [place] [address] AND [facility] for evaluation. My notes for this consultation are attached. If you have questions, please do not hesitate to call me. I look forward to following your patient along with you.       Sincerely,    Kenny Barboza MD

## 2022-03-18 PROBLEM — H40.9 GLAUCOMA: Status: ACTIVE | Noted: 2021-08-06

## 2022-03-18 PROBLEM — L80 VITILIGO: Status: ACTIVE | Noted: 2020-05-06

## 2022-03-18 PROBLEM — R94.6 ABNORMAL THYROID EXAM: Status: ACTIVE | Noted: 2020-05-06

## 2022-03-18 PROBLEM — J01.90 ACUTE SINUSITIS: Status: ACTIVE | Noted: 2021-08-06

## 2022-03-19 PROBLEM — R53.81 MALAISE AND FATIGUE: Status: ACTIVE | Noted: 2021-08-06

## 2022-03-19 PROBLEM — S06.5XAA SUBDURAL HEMATOMA (HCC): Status: ACTIVE | Noted: 2021-11-01

## 2022-03-19 PROBLEM — M89.9 DISORDER OF BONE AND ARTICULAR CARTILAGE: Status: ACTIVE | Noted: 2021-08-06

## 2022-03-19 PROBLEM — M19.90 ARTHRITIS: Status: ACTIVE | Noted: 2020-05-06

## 2022-03-19 PROBLEM — R53.83 MALAISE AND FATIGUE: Status: ACTIVE | Noted: 2021-08-06

## 2022-03-19 PROBLEM — M94.9 DISORDER OF BONE AND ARTICULAR CARTILAGE: Status: ACTIVE | Noted: 2021-08-06

## 2022-03-19 PROBLEM — E78.5 DYSLIPIDEMIA: Status: ACTIVE | Noted: 2021-11-01

## 2022-03-19 PROBLEM — M17.9 KNEE OSTEOARTHRITIS: Status: ACTIVE | Noted: 2021-12-09

## 2022-03-19 PROBLEM — N81.10 PELVIC ORGAN PROLAPSE QUANTIFICATION STAGE 3 CYSTOCELE: Status: ACTIVE | Noted: 2019-11-26

## 2022-03-19 PROBLEM — H26.9 CATARACT: Status: ACTIVE | Noted: 2021-08-06

## 2022-03-20 PROBLEM — M17.12 OSTEOARTHRITIS OF LEFT KNEE: Status: ACTIVE | Noted: 2021-12-09

## 2022-04-08 ENCOUNTER — OFFICE VISIT (OUTPATIENT)
Dept: ORTHOPEDIC SURGERY | Age: 82
End: 2022-04-08
Payer: MEDICARE

## 2022-04-08 DIAGNOSIS — M25.562 LEFT KNEE PAIN, UNSPECIFIED CHRONICITY: Primary | ICD-10-CM

## 2022-04-08 PROCEDURE — 1090F PRES/ABSN URINE INCON ASSESS: CPT | Performed by: ORTHOPAEDIC SURGERY

## 2022-04-08 PROCEDURE — 1101F PT FALLS ASSESS-DOCD LE1/YR: CPT | Performed by: ORTHOPAEDIC SURGERY

## 2022-04-08 PROCEDURE — G8432 DEP SCR NOT DOC, RNG: HCPCS | Performed by: ORTHOPAEDIC SURGERY

## 2022-04-08 PROCEDURE — G8427 DOCREV CUR MEDS BY ELIG CLIN: HCPCS | Performed by: ORTHOPAEDIC SURGERY

## 2022-04-08 PROCEDURE — G8536 NO DOC ELDER MAL SCRN: HCPCS | Performed by: ORTHOPAEDIC SURGERY

## 2022-04-08 PROCEDURE — G8419 CALC BMI OUT NRM PARAM NOF/U: HCPCS | Performed by: ORTHOPAEDIC SURGERY

## 2022-04-08 PROCEDURE — 99213 OFFICE O/P EST LOW 20 MIN: CPT | Performed by: ORTHOPAEDIC SURGERY

## 2022-04-08 NOTE — PATIENT INSTRUCTIONS
Knee Pain or Injury: Care Instructions  Your Care Instructions     Injuries are a common cause of knee problems. Sudden (acute) injuries may be caused by a direct blow to the knee. They can also be caused by abnormal twisting, bending, or falling on the knee. Pain, bruising, or swelling may be severe, and may start within minutes of the injury. Overuse is another cause of knee pain. Other causes are climbing stairs, kneeling, and other activities that use the knee. Everyday wear and tear, especially as you get older, also can cause knee pain. Rest, along with home treatment, often relieves pain and allows your knee to heal. If you have a serious knee injury, you may need tests and treatment. Follow-up care is a key part of your treatment and safety. Be sure to make and go to all appointments, and call your doctor if you are having problems. It's also a good idea to know your test results and keep a list of the medicines you take. How can you care for yourself at home? · Be safe with medicines. Read and follow all instructions on the label. ? If the doctor gave you a prescription medicine for pain, take it as prescribed. ? If you are not taking a prescription pain medicine, ask your doctor if you can take an over-the-counter medicine. · Rest and protect your knee. Take a break from any activity that may cause pain. · Put ice or a cold pack on your knee for 10 to 20 minutes at a time. Put a thin cloth between the ice and your skin. · Prop up a sore knee on a pillow when you ice it or anytime you sit or lie down for the next 3 days. Try to keep it above the level of your heart. This will help reduce swelling. · If your knee is not swollen, you can put moist heat, a heating pad, or a warm cloth on your knee. · If your doctor recommends an elastic bandage, sleeve, or other type of support for your knee, wear it as directed.   · Follow your doctor's instructions about how much weight you can put on your leg. Use a cane, crutches, or a walker as instructed. · Follow your doctor's instructions about activity during your healing process. If you can do mild exercise, slowly increase your activity. · Reach and stay at a healthy weight. Extra weight can strain the joints, especially the knees and hips, and make the pain worse. Losing even a few pounds may help. When should you call for help? Call 911 anytime you think you may need emergency care. For example, call if:    · You have symptoms of a blood clot in your lung (called a pulmonary embolism). These may include:  ? Sudden chest pain. ? Trouble breathing. ? Coughing up blood. Call your doctor now or seek immediate medical care if:    · You have severe or increasing pain.     · Your leg or foot turns cold or changes color.     · You cannot stand or put weight on your knee.     · Your knee looks twisted or bent out of shape.     · You cannot move your knee.     · You have signs of infection, such as:  ? Increased pain, swelling, warmth, or redness. ? Red streaks leading from the knee. ? Pus draining from a place on your knee. ? A fever.     · You have signs of a blood clot in your leg (called a deep vein thrombosis), such as:  ? Pain in your calf, back of the knee, thigh, or groin. ? Redness and swelling in your leg or groin. Watch closely for changes in your health, and be sure to contact your doctor if:    · You have tingling, weakness, or numbness in your knee.     · You have any new symptoms, such as swelling.     · You have bruises from a knee injury that last longer than 2 weeks.     · You do not get better as expected. Where can you learn more? Go to http://www.gray.com/  Enter K195 in the search box to learn more about \"Knee Pain or Injury: Care Instructions. \"  Current as of: July 1, 2021               Content Version: 13.2  © 5087-6674 Healthwise, [employer].    Care instructions adapted under license by [employer] (which disclaims liability or warranty for this information). If you have questions about a medical condition or this instruction, always ask your healthcare professional. [address] any warranty or liability for your use of this information.

## 2022-04-08 NOTE — PROGRESS NOTES
Name: Amanda Griffin    : 1940     Service Dept: [address] [place] [address] Medicine    Chief Complaint   Patient presents with    Knee Pain        There were no vitals taken for this visit. Allergies   Allergen Reactions    Latex Rash    Iodinated Contrast Media Hives    Penicillins Hives    Aspirin Unknown (comments)    Black Pepper Rash     Other reaction(s): Abdominal Pain    Iodine Unknown (comments)    Lumigan [Bimatoprost] Other (comments)     Eye irritation and redness    Mint Unknown (comments)      MOUTH BURNING    Nitrofurantoin Hives    Tamsulosin Unknown (comments)    Wheat Itching    Yeast Swelling        Current Outpatient Medications   Medication Sig Dispense Refill    ondansetron (ZOFRAN ODT) 4 mg disintegrating tablet TAKE 1 TABLET BY MOUTH EVERY EIGHT (8) HOURS AS NEEDED FOR NAUSEA, VOMITING OR NAUSEA OR VOMITING FOR UP TO 20 DOSES. 20 Tablet 0    Lumigan 0.01 % ophthalmic drops INSTILL 1 DROP INTO BOTH EYES AT BEDTIME      levothyroxine (synthroid) 50 mcg tablet Take 50 mcg by mouth daily.  prednisoLONE acetate (PRED FORTE) 1 % ophthalmic suspension INSTILL 2 DROPS INTO EACH EAR ONCE A WEEK      mirabegron ER (Myrbetriq) 25 mg ER tablet Take 1 Tab by mouth daily. 90 Tab 1    multivitamin (ONE A DAY) tablet Take 1 Tab by mouth daily.  cyanocobalamin (VITAMIN B12) 1,000 mcg/mL injection 1,000 mcg by IntraMUSCular route once.  pyridoxine, vitamin B6, (VITAMIN B-6) 100 mg tablet Take 100 mg by mouth daily.  magnesium 250 mg tab Take  by mouth.  Biotin 2,500 mcg cap Take  by mouth.  cholecalciferol, vitamin D3, (VITAMIN D3) 2,000 unit tab Take  by mouth.  SYNTHROID 50 mcg tablet Take 50 mcg by mouth Daily (before breakfast).         Patient Active Problem List   Diagnosis Code    Pelvic organ prolapse quantification stage 3 cystocele N81.10    Acute sinusitis J01.90    Arthritis M19.90    Cataract H26.9    Disorder of bone and articular cartilage M89.9, M94.9    Elevated blood-pressure reading without diagnosis of hypertension R03.0    Glaucoma H40.9    Abnormal thyroid exam R94.6    IBS (irritable bowel syndrome) K58.9    Impacted cerumen H61.20    Knee pain M25.569    Malaise and fatigue R53.81, R53.83    Mixed hyperlipidemia E78.2    Osteoporosis M81.0    Overweight with body mass index (BMI) 25.0-29.9 E66.3    Pain in limb M79.609    Proteinuria R80.9    Sarcoidosis D86.9    Vitiligo L80    Dyslipidemia E78.5    Subdural hematoma (HCC) S06.5X9A    Knee osteoarthritis M17.10    Osteoarthritis of left knee M17.12      Family History   Problem Relation Age of Onset    Coronary Art Dis Mother     Cancer Father         malignant neoplastic disease(brain)    Coronary Art Dis Sister     Other Brother         neurological finding      Social History     Socioeconomic History    Marital status:    Tobacco Use    Smoking status: Former Smoker     Packs/day: 1.00     Years: 10.00     Pack years: 10.00     Quit date: 2000     Years since quittin.2    Smokeless tobacco: Never Used   Vaping Use    Vaping Use: Never used   Substance and Sexual Activity    Alcohol use: Not Currently    Drug use: Never      Past Surgical History:   Procedure Laterality Date    HX CARPAL TUNNEL RELEASE      HX CATARACT REMOVAL Bilateral 2011; 10/2011    both    HX CRANIOTOMY  1985    HX HYSTERECTOMY      HX OTHER SURGICAL  2010    R finger , 3rd finger Trigger release    HX OTHER SURGICAL  2009    Right hand excision Lipoma Dr. Eli Fontanez      Past Medical History:   Diagnosis Date    Arthritis     Female cystocele     asymptomatic    High cholesterol     Hypothyroidism 2015    Mixed hyperlipidemia 2016    Subdural hematoma, post-traumatic ([facility] [address]     Urinary incontinence         I have reviewed and agree with PFSH and ROS and intake form in chart and the record furthermore I have reviewed prior medical record(s) regarding this patients care during this appointment. Review of Systems:   Patient is a pleasant appearing individual, appropriately dressed, well hydrated, well nourished, who is alert, appropriately oriented for age, and in no acute distress with a normal gait and normal affect who does not appear to be in any significant pain. Physical Exam:  Left knee - Neurovascularly intact with good cap refill, full range of motion and full strength, well healed incision noted, no swelling, no erythema, no instability. Right knee - Decrease range of motion with flexion, Some crepitation, Grossly neurovascularly intact, Good cap refill, No skin lesion, Moderate swelling, No gross instability, Some quadriceps weakness   Encounter Diagnoses     ICD-10-CM ICD-9-CM   1. Left knee pain, unspecified chronicity  M25.562 719.46       HPI:  The patient is here with a chief complaint of left knee pain, throbbing, burning pain. It has been the same. Pain is 5/10. The patient is status post left total knee replacement, doing well otherwise. Assessment/Plan:  Plan at this point, activities as tolerated started, no restrictions. We will see the patient back as needed and go from there. As part of continued conservative pain management options the patient was advised to utilize Tylenol or OTC NSAIDS as long as it is not medically contraindicated. Return to Office: Follow-up and Dispositions    · Return if symptoms worsen or fail to improve. Scribed by Mansoor Franco LPN as dictated by [facility] ALEXA Willis MD.  Documentation True and Accepted [facility] ALEXA Willis MD

## 2022-12-30 NOTE — LETTER
-- DO NOT REPLY / DO NOT REPLY ALL --  -- Message is from [facility] ([facility])     General Patient Message: Arnie with [employer] calling to confirm if patient has surgery with . Please call back to confirm. Any will be able to assist.    Caller Information       Type Contact Phone/Fax    12/30/2022 09:38 AM CST Phone (Incoming) Arnie/[facility] [phone]        Alternative phone number: none    Can a detailed message be left? Yes    Message Turnaround: [place]:    Refer to site's KB page for routing instructions    Please give this turnaround time to the caller:   \"You can expect to receive a response 2-3 business days after your provider's clinical team reviews the message\"               4/11/2022    Patient: Vince Chris   YOB: 1940   Date of Visit: 4/8/2022     Tasha Carmichael MD  [address]  Via Fax: [phone]    Dear Tasha Carmichael MD,      Thank you for referring Ms. Celso Blanchard to [facility] [address] AND [facility] for evaluation. My notes for this consultation are attached. If you have questions, please do not hesitate to call me. I look forward to following your patient along with you.       Sincerely,    Brittni Limon MD

## 2023-03-14 DIAGNOSIS — M25.562 LEFT KNEE PAIN, UNSPECIFIED CHRONICITY: Primary | ICD-10-CM

## 2023-03-17 ENCOUNTER — OFFICE VISIT (OUTPATIENT)
Age: 83
End: 2023-03-17
Payer: MEDICARE

## 2023-03-17 VITALS — WEIGHT: 129 LBS | HEIGHT: 60 IN | BODY MASS INDEX: 25.32 KG/M2

## 2023-03-17 DIAGNOSIS — M25.562 LEFT KNEE PAIN, UNSPECIFIED CHRONICITY: Primary | ICD-10-CM

## 2023-03-17 PROCEDURE — G8427 DOCREV CUR MEDS BY ELIG CLIN: HCPCS | Performed by: ORTHOPAEDIC SURGERY

## 2023-03-17 PROCEDURE — 1090F PRES/ABSN URINE INCON ASSESS: CPT | Performed by: ORTHOPAEDIC SURGERY

## 2023-03-17 PROCEDURE — G8399 PT W/DXA RESULTS DOCUMENT: HCPCS | Performed by: ORTHOPAEDIC SURGERY

## 2023-03-17 PROCEDURE — 1123F ACP DISCUSS/DSCN MKR DOCD: CPT | Performed by: ORTHOPAEDIC SURGERY

## 2023-03-17 PROCEDURE — G8417 CALC BMI ABV UP PARAM F/U: HCPCS | Performed by: ORTHOPAEDIC SURGERY

## 2023-03-17 PROCEDURE — 99212 OFFICE O/P EST SF 10 MIN: CPT | Performed by: ORTHOPAEDIC SURGERY

## 2023-03-17 PROCEDURE — 1036F TOBACCO NON-USER: CPT | Performed by: ORTHOPAEDIC SURGERY

## 2023-03-17 PROCEDURE — G8484 FLU IMMUNIZE NO ADMIN: HCPCS | Performed by: ORTHOPAEDIC SURGERY

## 2023-03-17 NOTE — PATIENT INSTRUCTIONS
as instructed. Follow your doctor's instructions about activity during your healing process. If you can do mild exercise, slowly increase your activity. Stay at a healthy weight. Extra weight can strain the joints, especially the knees and hips, and make the pain worse. Losing a few pounds may help. When should you call for help? Call 911 anytime you think you may need emergency care. For example, call if:    You have symptoms of a blood clot in your lung (called a pulmonary embolism). These may include:  Sudden chest pain. Trouble breathing. Coughing up blood. Call your doctor now or seek immediate medical care if:    You have severe or increasing pain. Your leg or foot turns cold or changes color. You cannot stand or put weight on your knee. Your knee looks twisted or bent out of shape. You cannot move your knee. You have signs of infection, such as: Increased pain, swelling, warmth, or redness. Red streaks leading from the knee. Pus draining from a place on your knee. A fever. You have signs of a blood clot in your leg (called a deep vein thrombosis), such as:  Pain in your calf, back of the knee, thigh, or groin. Redness and swelling in your leg or groin. Watch closely for changes in your health, and be sure to contact your doctor if:    You have tingling, weakness, or numbness in your knee. You have any new symptoms, such as swelling. You have bruises from a knee injury that last longer than 2 weeks. You do not get better as expected. Where can you learn more? Go to http://www.woods.com/ and enter K195 to learn more about \"Knee Pain or Injury: Care Instructions. \"  Current as of: March 9, 2022               Content Version: 13.5  © 3328-6940 Healthwise, Incorporated. Care instructions adapted under license by [facility] [employer] [facility] ([facility]).  If you have questions about a medical condition or this instruction, always ask your healthcare professional.

## 2023-03-17 NOTE — PROGRESS NOTES
and normal affect who does not appear to be in any significant pain. Physical Exam:  Left knee - Neurovascularly intact with good cap refill, full range of motion and full strength, well healed incision noted, no swelling, no erythema, no instability. Right knee - Decrease range of motion with flexion, Some crepitation, Grossly neurovascularly intact, Good cap refill, No skin lesion, Moderate swelling, No gross instability, Some quadriceps weakness           HPI:  The patient is here with left knee pain, throbbing burning pain, status post left knee replacement. Doing well. Pain is 9/10. X-rays show well-placed prosthesis. Assessment/Plan:  Plan at this point, activities as tolerated, weightbearing started, no restrictions. We will see the patient back as needed and go from there. As part of continued conservative pain management options the patient was advised to utilize Tylenol or OTC NSAIDS as long as it is not medically contraindicated. Return to Office: Follow-up and Dispositions    Return if symptoms worsen or fail to improve. Scribed by Jacinto Levy LPN as dictated by [facility] NANCY Herr MD.  Documentation, performed by, True and Accepted Marcelo Herr MD

## 2023-10-10 DIAGNOSIS — Z96.652 PRESENCE OF LEFT ARTIFICIAL KNEE JOINT: Primary | ICD-10-CM

## 2023-10-12 DIAGNOSIS — Z96.652 PRESENCE OF LEFT ARTIFICIAL KNEE JOINT: ICD-10-CM

## 2023-10-13 ENCOUNTER — OFFICE VISIT (OUTPATIENT)
Age: 83
End: 2023-10-13
Payer: MEDICARE

## 2023-10-13 DIAGNOSIS — M25.562 LEFT KNEE PAIN, UNSPECIFIED CHRONICITY: Primary | ICD-10-CM

## 2023-10-13 PROCEDURE — 1090F PRES/ABSN URINE INCON ASSESS: CPT | Performed by: ORTHOPAEDIC SURGERY

## 2023-10-13 PROCEDURE — G8484 FLU IMMUNIZE NO ADMIN: HCPCS | Performed by: ORTHOPAEDIC SURGERY

## 2023-10-13 PROCEDURE — 99212 OFFICE O/P EST SF 10 MIN: CPT | Performed by: ORTHOPAEDIC SURGERY

## 2023-10-13 PROCEDURE — G8399 PT W/DXA RESULTS DOCUMENT: HCPCS | Performed by: ORTHOPAEDIC SURGERY

## 2023-10-13 PROCEDURE — G8417 CALC BMI ABV UP PARAM F/U: HCPCS | Performed by: ORTHOPAEDIC SURGERY

## 2023-10-13 PROCEDURE — G8427 DOCREV CUR MEDS BY ELIG CLIN: HCPCS | Performed by: ORTHOPAEDIC SURGERY

## 2023-10-13 PROCEDURE — 1036F TOBACCO NON-USER: CPT | Performed by: ORTHOPAEDIC SURGERY

## 2023-10-13 PROCEDURE — 1123F ACP DISCUSS/DSCN MKR DOCD: CPT | Performed by: ORTHOPAEDIC SURGERY

## 2023-10-13 NOTE — PROGRESS NOTES
Name: Noemi Carey    : 1940     [address] [facility] SPECIALTY  [facility] AND [facility] MEDICINE  [address] [address] [address] [id number]  Dept: [phone]  Dept Fax: [phone]     Chief Complaint   Patient presents with    Knee Pain        There were no vitals taken for this visit. Allergies   Allergen Reactions    Latex Rash    Iodinated Contrast Media Hives    Penicillins Hives    Aspirin      Other reaction(s): Unknown (comments)    Bimatoprost Other (See Comments)     Eye irritation and redness    Iodine      Other reaction(s): Unknown (comments)    Nitrofurantoin Hives    Peppermint Oil      Other reaction(s): Unknown (comments)   MOUTH BURNING    Tamsulosin      Other reaction(s): Unknown (comments)    Wheat Itching    Yeast Swelling    Piper Rash     Other reaction(s): Abdominal Pain        Current Outpatient Medications   Medication Sig Dispense Refill    mirabegron (MYRBETRIQ) 25 MG TB24 Take 25 mg by mouth daily      ondansetron (ZOFRAN-ODT) 4 MG disintegrating tablet Take 4 mg by mouth every 8 hours as needed      prednisoLONE acetate (PRED FORTE) 1 % ophthalmic suspension INSTILL 2 DROPS INTO EACH EAR ONCE A WEEK      pyridoxine (B-6) 100 MG tablet Take 100 mg by mouth daily      bimatoprost (LUMIGAN) 0.01 % SOLN ophthalmic drops INSTILL 1 DROP INTO BOTH EYES AT BEDTIME      Biotin 2.5 MG CAPS Take by mouth      Cholecalciferol 50 MCG (2000 UT) TABS Take by mouth      cyanocobalamin 1000 MCG/ML injection Inject 1,000 mcg into the muscle once      levothyroxine (SYNTHROID) 50 MCG tablet Take 50 mcg by mouth daily       No current facility-administered medications for this visit.       Patient Active Problem List   Diagnosis    Elevated blood-pressure reading without diagnosis of hypertension    Mixed hyperlipidemia    Glaucoma    Acute sinusitis    Abnormal thyroid exam    Pain in limb    Proteinuria    Sarcoidosis    Vitiligo    Osteoporosis

## (undated) DEVICE — TROCAR: Brand: KII® OPTICAL ACCESS SYSTEM

## (undated) DEVICE — SPONGE LAP SOFT 18X18 IN X RAY DETECTABLE

## (undated) DEVICE — CLIP LIG ABSRB HEM-LOK LG PUR --

## (undated) DEVICE — HYPODERMIC SAFETY NEEDLE: Brand: MAGELLAN

## (undated) DEVICE — SHEET,DRAPE,70X100,STERILE: Brand: MEDLINE

## (undated) DEVICE — DEVON™ KNEE AND BODY STRAP 60" X 3" (1.5 M X 7.6 CM): Brand: DEVON

## (undated) DEVICE — Device

## (undated) DEVICE — SYR 10ML CTRL LR LCK NSAF LF --

## (undated) DEVICE — GOWN,AURORA,FABRIC-REINFORCED,X-LARGE: Brand: MEDLINE

## (undated) DEVICE — SUTURE VCRL SZ 1 L27IN ABSRB UD CT-1 L36MM 1/2 CIR J261H

## (undated) DEVICE — INTENDED FOR TISSUE SEPARATION, AND OTHER PROCEDURES THAT REQUIRE A SHARP SURGICAL BLADE TO PUNCTURE OR CUT.: Brand: BARD-PARKER SAFETY BLADES SIZE 10, STERILE

## (undated) DEVICE — DEVICE SECUREMENT AD W/ TRICOT ANCHR PD FOR F LTX SIL CATH

## (undated) DEVICE — STERILE POLYISOPRENE POWDER-FREE SURGICAL GLOVES: Brand: PROTEXIS

## (undated) DEVICE — POWDER HEMOSTAT GEL 3.0GR -- SURGICEL

## (undated) DEVICE — (D)PREP SKN CHLRAPRP APPL 26ML -- CONVERT TO ITEM 371833

## (undated) DEVICE — SUTURE VCRL SZ 0 L27IN ABSRB UD L26MM CT-2 1/2 CIR J270H

## (undated) DEVICE — CATHETER F BLLN 5CC 16FR 2 W HYDRGEL COAT LESS TRAUM LUB

## (undated) DEVICE — BOWL BNE CEM MIX SPAT CURET SMARTMIX CTS

## (undated) DEVICE — TIP COVER ACCESSORY

## (undated) DEVICE — SUTURE SZ 0 27IN 5/8 CIR UR-6  TAPER PT VIOLET ABSRB VICRYL J603H

## (undated) DEVICE — SUTURE VCRL SZ 3-0 L27IN ABSRB UD L24MM PS-1 3/8 CIR PRIM J936H

## (undated) DEVICE — STERILE POLYISOPRENE POWDER-FREE SURGICAL GLOVES WITH EMOLLIENT COATING: Brand: PROTEXIS

## (undated) DEVICE — GLOVE SURG SZ 75 L12IN FNGR THK79MIL GRN LTX FREE

## (undated) DEVICE — 40580 - THE PINK PAD - ADVANCED TRENDELENBURG POSITIONING KIT: Brand: 40580 - THE PINK PAD - ADVANCED TRENDELENBURG POSITIONING KIT

## (undated) DEVICE — SOL IRR NACL 0.9% 500ML POUR --

## (undated) DEVICE — SUTURE DEV SZ 3-0 V-LOC 90 L12IN TO L18IN CV-23 VLT VLOCM0844

## (undated) DEVICE — ELECTRO LUBE IS A SINGLE PATIENT USE DEVICE THAT IS INTENDED TO BE USED ON ELECTROSURGICAL ELECTRODES TO REDUCE STICKING.: Brand: KEY SURGICAL ELECTRO LUBE

## (undated) DEVICE — LAPAROSCOPIC TROCAR SLEEVE/SINGLE USE: Brand: KII® OPTICAL ACCESS SYSTEM

## (undated) DEVICE — 4-PORT MANIFOLD: Brand: NEPTUNE 2

## (undated) DEVICE — SUTURE VCRL SZ 3-0 L27IN ABSRB UD L26MM SH 1/2 CIR J416H

## (undated) DEVICE — BIPOLAR FORCEPS CORD: Brand: VALLEYLAB

## (undated) DEVICE — UNDERGLOVE SURG SZ 7.5 BLU LTX FREE SYN POLYISOPRENE

## (undated) DEVICE — KENDALL SCD EXPRESS SLEEVES, KNEE LENGTH, MEDIUM: Brand: KENDALL SCD

## (undated) DEVICE — 3M™ DURAPORE™ SURGICAL TAPE 1538-3, 3 INCH X 10 YARD (7,5CM X 9,1M), 4 ROLLS/BOX: Brand: 3M™ DURAPORE™

## (undated) DEVICE — SUTURE PDS II SZ 3-0 L27IN ABSRB UD FS-1 L24MM 3/8 CIR REV Z442H

## (undated) DEVICE — WRAP KNEE UNIV E STRP HK AND LOOP W/ GEL PK MEDCOOL

## (undated) DEVICE — (D)HANDPIECE IRR W/HI FLO TIP -- DUPLICATE USE ITEM 121586

## (undated) DEVICE — REM POLYHESIVE ADULT PATIENT RETURN ELECTRODE: Brand: VALLEYLAB

## (undated) DEVICE — DRAPE,TOP,102X53,STERILE: Brand: MEDLINE

## (undated) DEVICE — 3M™ IOBAN™ 2 ANTIMICROBIAL INCISE DRAPE 6650EZ: Brand: IOBAN™ 2

## (undated) DEVICE — LAPAROSCOPIC SCISSORS: Brand: EPIX LAPAROSCOPIC SCISSORS

## (undated) DEVICE — BLADE ELECTRODE: Brand: VALLEYLAB

## (undated) DEVICE — SUTURE VCRL SZ 2-0 L27IN ABSRB UD L26MM SH 1/2 CIR J417H

## (undated) DEVICE — BODY ALIGNER: Brand: DEROYAL

## (undated) DEVICE — APPLICATOR BNDG 1MM ADH PREMIERPRO EXOFIN

## (undated) DEVICE — GLOVE SURG 7 BIOGEL PI ULTRATOUCH G

## (undated) DEVICE — GLOVE ORANGE PI 8 1/2   MSG9085

## (undated) DEVICE — 40418 TRENDELENBURG ONE-STEP ARM PROTECTORS LARGE (1 PAIR): Brand: 40418 TRENDELENBURG ONE-STEP ARM PROTECTORS LARGE (1 PAIR)

## (undated) DEVICE — SKIN MARKER,REGULAR TIP WITH RULER AND LABELS: Brand: DEVON

## (undated) DEVICE — MEDI-VAC NON-CONDUCTIVE SUCTION TUBING: Brand: CARDINAL HEALTH

## (undated) DEVICE — SUT VCRL + 0 27IN CT1 UD --

## (undated) DEVICE — GARMENT COMPR M FOR 13IN FT INTMIT SGL BLDR HEM FORC II

## (undated) DEVICE — SUTURE MCRYL SZ 4-0 L18IN ABSRB UD L19MM PS-2 3/8 CIR PRIM Y496G

## (undated) DEVICE — STRYKER PERFORMANCE SERIES SAGITTAL BLADE: Brand: STRYKER PERFORMANCE SERIES

## (undated) DEVICE — DISPOSABLE SUCTION/IRRIGATOR TUBE SET WITH TIP: Brand: AHTO

## (undated) DEVICE — LAPAROSCOPIC TROCAR SLEEVE/SINGLE USE: Brand: KII® SLEEVE

## (undated) DEVICE — DRESSING ANTIMIC FOAM OPTIFOAM POSTOP ADH 4X14 IN

## (undated) DEVICE — INSTRMT SET WND CLSR SUT PASS --

## (undated) DEVICE — BNDG,ELSTC,MATRIX,STRL,6"X5YD,LF,HOOK&LP: Brand: MEDLINE

## (undated) DEVICE — RECIPROCATING BLADE HEAVY DUTY LONG, OFFSET  (77.6 X 0.77 X 11.2MM)

## (undated) DEVICE — NEEDLE HYPO 25GA L1.5IN BVL ORIENTED ECLIPSE

## (undated) DEVICE — DRAPE KIT ACCS 4-ARM DISP -- DA VINCI

## (undated) DEVICE — COVER,TABLE,HEAVY DUTY,77"X90",STRL: Brand: MEDLINE

## (undated) DEVICE — GLOVE SURG SZ 65 THK91MIL LTX FREE SYN POLYISOPRENE

## (undated) DEVICE — LUB SURG MEDC STRL 2OZ TUBE MC -- MEDICHOICE

## (undated) DEVICE — ZIMMER® STERILE DISPOSABLE TOURNIQUET CUFF WITH PLC, DUAL PORT, SINGLE BLADDER, 34 IN. (86 CM)

## (undated) DEVICE — SUT VCRL + 2-0 27IN CT2 UD -- 36/BX

## (undated) DEVICE — BAG DRAIN URIN 2000ML LF STRL -- CONVERT TO ITEM 363123

## (undated) DEVICE — LIGHT HANDLE: Brand: DEVON

## (undated) DEVICE — TOTAL KNEE PACK: Brand: MEDLINE INDUSTRIES, INC.

## (undated) DEVICE — ATTUNE SOLO PINNING SYSTEM

## (undated) DEVICE — OBTRTR BLDELSS 8MM DISP -- DA VINCI - SNGL USE

## (undated) DEVICE — SUTURE GOR TX SZ 0 L36IN NONABSORBABLE L26MM THX-26 1/2 CIR 2N05A

## (undated) DEVICE — SOL ANTI-FOG 6ML MEDC -- MEDICHOICE - CONVERT TO 358427

## (undated) DEVICE — SKIN CLOS DERMABND PRINEO 60CM -- DERMABOUND PRINEO

## (undated) DEVICE — VISUALIZATION SYSTEM: Brand: CLEARIFY